# Patient Record
Sex: FEMALE | Race: BLACK OR AFRICAN AMERICAN | NOT HISPANIC OR LATINO | Employment: FULL TIME | URBAN - METROPOLITAN AREA
[De-identification: names, ages, dates, MRNs, and addresses within clinical notes are randomized per-mention and may not be internally consistent; named-entity substitution may affect disease eponyms.]

---

## 2017-04-12 ENCOUNTER — HOSPITAL ENCOUNTER (EMERGENCY)
Facility: HOSPITAL | Age: 52
Discharge: HOME | End: 2017-04-12
Attending: EMERGENCY MEDICINE
Payer: MEDICAID

## 2017-04-12 VITALS
SYSTOLIC BLOOD PRESSURE: 161 MMHG | WEIGHT: 205 LBS | HEIGHT: 67 IN | RESPIRATION RATE: 18 BRPM | HEART RATE: 62 BPM | DIASTOLIC BLOOD PRESSURE: 73 MMHG | BODY MASS INDEX: 32.18 KG/M2 | TEMPERATURE: 98 F | OXYGEN SATURATION: 100 %

## 2017-04-12 DIAGNOSIS — R42 DIZZINESS: ICD-10-CM

## 2017-04-12 LAB
ALBUMIN SERPL BCP-MCNC: 4 G/DL
ALP SERPL-CCNC: 93 U/L
ALT SERPL W/O P-5'-P-CCNC: 17 U/L
ANION GAP SERPL CALC-SCNC: 9 MMOL/L
AST SERPL-CCNC: 16 U/L
BASOPHILS # BLD AUTO: 0.02 K/UL
BASOPHILS NFR BLD: 0.4 %
BILIRUB SERPL-MCNC: 0.5 MG/DL
BILIRUB UR QL STRIP: NEGATIVE
BUN SERPL-MCNC: 13 MG/DL
CALCIUM SERPL-MCNC: 9.6 MG/DL
CHLORIDE SERPL-SCNC: 104 MMOL/L
CLARITY UR: CLEAR
CO2 SERPL-SCNC: 26 MMOL/L
COLOR UR: YELLOW
CREAT SERPL-MCNC: 0.7 MG/DL
DIFFERENTIAL METHOD: ABNORMAL
EOSINOPHIL # BLD AUTO: 0.1 K/UL
EOSINOPHIL NFR BLD: 2.6 %
ERYTHROCYTE [DISTWIDTH] IN BLOOD BY AUTOMATED COUNT: 14.5 %
EST. GFR  (AFRICAN AMERICAN): >60 ML/MIN/1.73 M^2
EST. GFR  (NON AFRICAN AMERICAN): >60 ML/MIN/1.73 M^2
GLUCOSE SERPL-MCNC: 107 MG/DL
GLUCOSE UR QL STRIP: NEGATIVE
HCT VFR BLD AUTO: 33.4 %
HGB BLD-MCNC: 11.8 G/DL
HGB UR QL STRIP: NEGATIVE
KETONES UR QL STRIP: NEGATIVE
LEUKOCYTE ESTERASE UR QL STRIP: NEGATIVE
LYMPHOCYTES # BLD AUTO: 1.4 K/UL
LYMPHOCYTES NFR BLD: 30.2 %
MCH RBC QN AUTO: 25.5 PG
MCHC RBC AUTO-ENTMCNC: 35.3 %
MCV RBC AUTO: 72 FL
MONOCYTES # BLD AUTO: 0.8 K/UL
MONOCYTES NFR BLD: 18.3 %
NEUTROPHILS # BLD AUTO: 2.2 K/UL
NEUTROPHILS NFR BLD: 48.5 %
NITRITE UR QL STRIP: NEGATIVE
PH UR STRIP: 8 [PH] (ref 5–8)
PLATELET # BLD AUTO: 177 K/UL
PMV BLD AUTO: 11.8 FL
POTASSIUM SERPL-SCNC: 4.3 MMOL/L
PROT SERPL-MCNC: 7.9 G/DL
PROT UR QL STRIP: NEGATIVE
RBC # BLD AUTO: 4.63 M/UL
SODIUM SERPL-SCNC: 139 MMOL/L
SP GR UR STRIP: 1.01 (ref 1–1.03)
TROPONIN I SERPL DL<=0.01 NG/ML-MCNC: 0.01 NG/ML
URN SPEC COLLECT METH UR: NORMAL
UROBILINOGEN UR STRIP-ACNC: NEGATIVE EU/DL
WBC # BLD AUTO: 4.6 K/UL

## 2017-04-12 PROCEDURE — 93005 ELECTROCARDIOGRAM TRACING: CPT

## 2017-04-12 PROCEDURE — 99284 EMERGENCY DEPT VISIT MOD MDM: CPT

## 2017-04-12 PROCEDURE — 85025 COMPLETE CBC W/AUTO DIFF WBC: CPT

## 2017-04-12 PROCEDURE — 93010 ELECTROCARDIOGRAM REPORT: CPT | Mod: ,,, | Performed by: INTERNAL MEDICINE

## 2017-04-12 PROCEDURE — 81003 URINALYSIS AUTO W/O SCOPE: CPT

## 2017-04-12 PROCEDURE — 84484 ASSAY OF TROPONIN QUANT: CPT

## 2017-04-12 PROCEDURE — 80053 COMPREHEN METABOLIC PANEL: CPT

## 2017-04-12 RX ORDER — CARVEDILOL 6.25 MG/1
6.25 TABLET ORAL 2 TIMES DAILY WITH MEALS
COMMUNITY

## 2017-04-12 RX ORDER — MECLIZINE HYDROCHLORIDE 25 MG/1
25 TABLET ORAL 2 TIMES DAILY PRN
Qty: 20 TABLET | Refills: 0 | Status: SHIPPED | OUTPATIENT
Start: 2017-04-12 | End: 2018-10-22

## 2017-04-12 NOTE — ED AVS SNAPSHOT
[facility]  [address] [place] [address]               Raiza Padilla   2017  7:06 AM   ED    Description:  Female : 1965   Department:  [facility]           Your Care was Coordinated By:     Provider Role From To    Joe Ramirez MD Attending Provider 17 0718 --      Reason for Visit     Dizziness           Diagnoses this Visit        Comments    Dizziness           ED Disposition     None           To Do List           Follow-up Information     Schedule an appointment as soon as possible for a visit with your primary doctor.        Follow up with [facility].    Specialty:  Emergency Medicine    Why:  If symptoms worsen    Contact information:    [address]  [place] [address] [phone]       These Medications        Disp Refills Start End    meclizine (ANTIVERT) 25 mg tablet 20 tablet 0 2017     Take 1 tablet (25 mg total) by mouth 2 (two) times daily as needed for Dizziness. - Oral      [facility]     [facility] Nurse Care Line -  Assistance  Unless otherwise directed by your provider, please contact [facility], our nurse care line that is available for  assistance.     Registered nurses in the [facility] provide: appointment scheduling, clinical advisement, health education, and other advisory services.  Call: [phone] (toll free)               Medications           Message regarding Medications     Verify the changes and/or additions to your medication regime listed below are the same as discussed with your clinician today.  If any of these changes or additions are incorrect, please notify your healthcare provider.        START taking these NEW medications        Refills    meclizine (ANTIVERT) 25 mg tablet 0    Sig: Take 1 tablet (25 mg total) by mouth 2 (two) times daily as needed for Dizziness.    Class: Print     "Route: Oral           Verify that the below list of medications is an accurate representation of the medications you are currently taking.  If none reported, the list may be blank. If incorrect, please contact your healthcare provider. Carry this list with you in case of emergency.           Current Medications     carvedilol (COREG) 6.25 MG tablet Take 6.25 mg by mouth 2 (two) times daily with meals.    meclizine (ANTIVERT) 25 mg tablet Take 1 tablet (25 mg total) by mouth 2 (two) times daily as needed for Dizziness.           Clinical Reference Information           Your Vitals Were     BP Pulse Temp Resp Height Weight    161/73 (BP Location: Right arm, Patient Position: Standing, BP Method: Automatic) 62 98.1 °F (36.7 °C) (Oral) 18 5' 7" (1.702 m) 93 kg (205 lb)    Last Period SpO2 BMI          08/12/2016 100% 32.11 kg/m2        Allergies as of 4/12/2017        Reactions    Codeine Nausea And Vomiting      Immunizations Administered on Date of Encounter - 4/12/2017     None      ED Micro, Lab, POCT     Start Ordered       Status Ordering Provider    04/12/17 0723 04/12/17 0722  CBC auto differential  STAT      Final result     04/12/17 0723 04/12/17 0722  Comprehensive metabolic panel  STAT      Final result     04/12/17 0723 04/12/17 0722  Troponin I  STAT      Final result     04/12/17 0723 04/12/17 0722  Urinalysis  STAT      Final result       ED Imaging Orders     Start Ordered       Status Ordering Provider    04/12/17 0723 04/12/17 0723  X-Ray Chest 1 View  1 time imaging      Final result         Discharge Instructions         Dizziness (Uncertain Cause)  Dizziness is a common symptom. It may be described as lightheadedness, spinning, or feeling like you are going to faint. Dizziness can have many causes.  Be sure to tell the healthcare provider about:  · All medicines you take, including prescription, over-the-counter, herbs, and supplements  · Any other symptoms you have  · Any health problems you are " being treated for  · Anything that causes the dizziness to get worse or better  Today's exam did not show an exact cause for your dizziness. Other tests may be needed. Follow up with your healthcare provider.  Home care  · Dizziness that occurs with sudden standing may be a sign of mild dehydration. Drink extra fluids for the next few days.  · If you recently started a new medicine, stopped a medicine, or had the dose of a current medicine changed, talk with the prescribing healthcare provider. Your medicine plan may need adjustment.  · If dizziness lasts more than a few seconds, sit or lie down until it passes. This may help prevent injury in case you pass out.  · Do not drive or use power tools or dangerous equipment until you have had no dizziness for at least 48 hours.  Follow-up care  Follow up with your healthcare provider for further evaluation within the next 7 days or as advised.  When to seek medical advice  Call your healthcare provider for any of the following:  · Worsening of symptoms or new symptoms  · Passing out or seizure  · Repeated vomiting  · Headache  · Palpitations (the sense that your heart is fluttering or beating fast or hard)  · Shortness of breath  · Blood in vomit or stool (black or red color)  · Weakness of an arm or leg or one side of the face  · Vision or hearing changes  · Trouble walking or speaking  · Chest, arm, neck, back, or jaw pain  Date Last Reviewed: 8/23/2015  © 9672-2781 [employer]. [address] [place] [address]. All rights reserved. This information is not intended as a substitute for professional medical care. Always follow your healthcare professional's instructions.          [facility] Sign-Up     Activating your [facility] account is as easy as 1-2-3!     1) Visit [email or web address], select Sign Up Now, enter this activation code and your date of birth, then select Next.  [email or web address]  Expires: 5/27/2017 10:55 AM      2) Create a username and  password to use when you visit [facility] in the future and select a security question in case you lose your password and select Next.    3) Enter your e-mail address and click Sign Up!    Additional Information  If you have questions, please e-mail [email or web address] or call [phone] to talk to our [facility] staff. Remember, [facility] is NOT to be used for urgent needs. For medical emergencies, dial 911.          [facility] complies with applicable Federal civil rights laws and does not discriminate on the basis of race, color, national origin, age, disability, or sex.        Language Assistance Services     ATTENTION: Language assistance services are available, free of charge. Please call [phone].      ATENCIÓN: Si habla español, tiene a gonzales disposición servicios gratuitos de asistencia lingüística. Llame al [phone].     CHÚ Ý: N?u b?n nói Ti?ng Vi?t, có các d?ch v? h? tr? ngôn ng? mi?n phí dành cho b?n. G?i s? [phone].

## 2017-04-12 NOTE — ED PROVIDER NOTES
Encounter Date: 2017       History     Chief Complaint   Patient presents with    Dizziness     pt states she woke up this morning with dizziness and nausea     Review of patient's allergies indicates:   Allergen Reactions    Codeine Nausea And Vomiting     HPI Comments: Patient is a 52-year-old female who says she felt lightheaded upon awakening this morning.  Patient says this usually happens to her every morning but only lasts very briefly and resolves.  Symptoms persisted this morning.  She had no syncope.  She denies headache or visual changes.  No neck pain or stiffness.  She denies chest pain or shortness of breath.    The history is provided by the patient.     Past Medical History:   Diagnosis Date    Hypertension      Past Surgical History:   Procedure Laterality Date     SECTION      TUBAL LIGATION       History reviewed. No pertinent family history.  Social History   Substance Use Topics    Smoking status: Never Smoker    Smokeless tobacco: Never Used    Alcohol use Yes      Comment: socially     Review of Systems   Constitutional: Negative for chills and fever.   Respiratory: Negative for shortness of breath.    Cardiovascular: Negative for chest pain.   Gastrointestinal: Negative for abdominal pain, nausea and vomiting.   Neurological: Positive for dizziness and light-headedness. Negative for syncope, weakness and numbness.   All other systems reviewed and are negative.      Physical Exam   Initial Vitals   BP Pulse Resp Temp SpO2   17 0700 17 0700 17 0700 17 0700 17 0700   169/73 60 18 98.1 °F (36.7 °C) 100 %     Physical Exam    Nursing note and vitals reviewed.  Constitutional: She appears well-developed and well-nourished.   HENT:   Head: Normocephalic and atraumatic.   Eyes: Conjunctivae and EOM are normal. Pupils are equal, round, and reactive to light.   Neck: Normal range of motion. Neck supple.   Cardiovascular: Normal rate, regular rhythm and  normal heart sounds.   Pulmonary/Chest: Breath sounds normal.   Abdominal: Soft. There is no tenderness. There is no rebound and no guarding.   Musculoskeletal: Normal range of motion.   Neurological: She is alert and oriented to person, place, and time. She has normal strength.   Skin: Skin is warm and dry.   Psychiatric: She has a normal mood and affect.         ED Course   Procedures  Labs Reviewed   CBC W/ AUTO DIFFERENTIAL - Abnormal; Notable for the following:        Result Value    Hemoglobin 11.8 (*)     Hematocrit 33.4 (*)     MCV 72 (*)     MCH 25.5 (*)     Mono% 18.3 (*)     All other components within normal limits   COMPREHENSIVE METABOLIC PANEL   TROPONIN I   URINALYSIS     EKG Readings: (Independently Interpreted)   Rhythm: Sinus Bradycardia. Heart Rate: 50. ST Segments: Normal ST Segments. T Waves: Normal.          Medical Decision Making:   Clinical Tests:   Lab Tests: Ordered and Reviewed  The following lab test(s) were unremarkable: CBC, CMP and Troponin  Radiological Study: Ordered and Reviewed  Medical Tests: Reviewed and Ordered  ED Management:  52-year-old female who felt dizzy this morning.  Patient feels much better after presenting to the ED.  She was noted with sinus bradycardia on her EKG with a rate of 50.  Patient is currently on a beta blocker.  Orthostatics are negative.  Heart rate has maintained in the low 60s.  I do not feel that her symptoms were due to bradycardia.  I have offered to give her an Antivert here in the ED although she refuses.  Patient says she feels better, has not eaten and would like to go home and rest.  I will send home with a prescription for Antivert to use as needed but have also urged very close follow-up for recheck of vital signs specifically her heart rate.  She will also return here for any worsening of her condition occurring prior to follow-up.                   ED Course     Clinical Impression:   The encounter diagnosis was Dizziness.           Joe Ramirez MD  04/12/17 0062

## 2017-04-12 NOTE — DISCHARGE INSTRUCTIONS

## 2017-04-12 NOTE — ED NOTES
Assumed care of this patient. Pt is resting on stretcehr, on cardiac, bp and o2 sat monitor. SR up and CB in reach. No distress noted, no complaints noted.

## 2017-04-15 ENCOUNTER — HOSPITAL ENCOUNTER (EMERGENCY)
Facility: HOSPITAL | Age: 52
Discharge: HOME | End: 2017-04-15
Attending: EMERGENCY MEDICINE
Payer: MEDICAID

## 2017-04-15 VITALS
RESPIRATION RATE: 19 BRPM | WEIGHT: 190 LBS | OXYGEN SATURATION: 99 % | HEART RATE: 55 BPM | SYSTOLIC BLOOD PRESSURE: 165 MMHG | HEIGHT: 67 IN | TEMPERATURE: 99 F | BODY MASS INDEX: 29.82 KG/M2 | DIASTOLIC BLOOD PRESSURE: 70 MMHG

## 2017-04-15 DIAGNOSIS — I10 ESSENTIAL HYPERTENSION: ICD-10-CM

## 2017-04-15 DIAGNOSIS — K52.9 GASTROENTERITIS: Primary | ICD-10-CM

## 2017-04-15 LAB
ALBUMIN SERPL BCP-MCNC: 4.4 G/DL
ALP SERPL-CCNC: 77 U/L
ALT SERPL W/O P-5'-P-CCNC: 43 U/L
ANION GAP SERPL CALC-SCNC: 11 MMOL/L
AST SERPL-CCNC: 31 U/L
BACTERIA #/AREA URNS HPF: NORMAL /HPF
BASOPHILS # BLD AUTO: 0.01 K/UL
BASOPHILS NFR BLD: 0.2 %
BILIRUB SERPL-MCNC: 0.6 MG/DL
BILIRUB UR QL STRIP: NEGATIVE
BUN SERPL-MCNC: 15 MG/DL
CALCIUM SERPL-MCNC: 9.8 MG/DL
CHLORIDE SERPL-SCNC: 105 MMOL/L
CLARITY UR: CLEAR
CO2 SERPL-SCNC: 23 MMOL/L
COLOR UR: YELLOW
CREAT SERPL-MCNC: 0.8 MG/DL
DIFFERENTIAL METHOD: ABNORMAL
EOSINOPHIL # BLD AUTO: 0 K/UL
EOSINOPHIL NFR BLD: 0.6 %
ERYTHROCYTE [DISTWIDTH] IN BLOOD BY AUTOMATED COUNT: 14.5 %
EST. GFR  (AFRICAN AMERICAN): >60 ML/MIN/1.73 M^2
EST. GFR  (NON AFRICAN AMERICAN): >60 ML/MIN/1.73 M^2
GLUCOSE SERPL-MCNC: 96 MG/DL
GLUCOSE UR QL STRIP: NEGATIVE
HCT VFR BLD AUTO: 37.1 %
HGB BLD-MCNC: 13.2 G/DL
HGB UR QL STRIP: NEGATIVE
HYALINE CASTS #/AREA URNS LPF: 0 /LPF
KETONES UR QL STRIP: ABNORMAL
LEUKOCYTE ESTERASE UR QL STRIP: NEGATIVE
LIPASE SERPL-CCNC: 20 U/L
LYMPHOCYTES # BLD AUTO: 1.5 K/UL
LYMPHOCYTES NFR BLD: 23.6 %
MCH RBC QN AUTO: 25.4 PG
MCHC RBC AUTO-ENTMCNC: 35.6 %
MCV RBC AUTO: 71 FL
MICROSCOPIC COMMENT: NORMAL
MONOCYTES # BLD AUTO: 0.8 K/UL
MONOCYTES NFR BLD: 13 %
NEUTROPHILS # BLD AUTO: 4 K/UL
NEUTROPHILS NFR BLD: 62.6 %
NITRITE UR QL STRIP: NEGATIVE
PH UR STRIP: 7 [PH] (ref 5–8)
PLATELET # BLD AUTO: 204 K/UL
PLATELET BLD QL SMEAR: ABNORMAL
PMV BLD AUTO: 11.3 FL
POTASSIUM SERPL-SCNC: 3.9 MMOL/L
PROT SERPL-MCNC: 8.5 G/DL
PROT UR QL STRIP: ABNORMAL
RBC # BLD AUTO: 5.2 M/UL
RBC #/AREA URNS HPF: 1 /HPF (ref 0–4)
SODIUM SERPL-SCNC: 139 MMOL/L
SP GR UR STRIP: 1.02 (ref 1–1.03)
URN SPEC COLLECT METH UR: ABNORMAL
UROBILINOGEN UR STRIP-ACNC: 1 EU/DL
WBC # BLD AUTO: 6.39 K/UL
WBC #/AREA URNS HPF: 2 /HPF (ref 0–5)

## 2017-04-15 PROCEDURE — 93010 ELECTROCARDIOGRAM REPORT: CPT | Mod: ,,, | Performed by: INTERNAL MEDICINE

## 2017-04-15 PROCEDURE — 25000003 PHARM REV CODE 250: Performed by: EMERGENCY MEDICINE

## 2017-04-15 PROCEDURE — 63600175 PHARM REV CODE 636 W HCPCS: Performed by: EMERGENCY MEDICINE

## 2017-04-15 PROCEDURE — 81000 URINALYSIS NONAUTO W/SCOPE: CPT

## 2017-04-15 PROCEDURE — 96375 TX/PRO/DX INJ NEW DRUG ADDON: CPT

## 2017-04-15 PROCEDURE — 93005 ELECTROCARDIOGRAM TRACING: CPT

## 2017-04-15 PROCEDURE — 96374 THER/PROPH/DIAG INJ IV PUSH: CPT

## 2017-04-15 PROCEDURE — 99284 EMERGENCY DEPT VISIT MOD MDM: CPT | Mod: 25

## 2017-04-15 PROCEDURE — 83690 ASSAY OF LIPASE: CPT

## 2017-04-15 PROCEDURE — 96361 HYDRATE IV INFUSION ADD-ON: CPT

## 2017-04-15 PROCEDURE — 80053 COMPREHEN METABOLIC PANEL: CPT

## 2017-04-15 PROCEDURE — 85025 COMPLETE CBC W/AUTO DIFF WBC: CPT

## 2017-04-15 RX ORDER — ONDANSETRON 4 MG/1
4 TABLET, FILM COATED ORAL EVERY 6 HOURS PRN
Qty: 30 TABLET | Refills: 0 | Status: SHIPPED | OUTPATIENT
Start: 2017-04-15 | End: 2018-10-22

## 2017-04-15 RX ORDER — HYDROMORPHONE HYDROCHLORIDE 1 MG/ML
0.5 INJECTION, SOLUTION INTRAMUSCULAR; INTRAVENOUS; SUBCUTANEOUS
Status: COMPLETED | OUTPATIENT
Start: 2017-04-15 | End: 2017-04-15

## 2017-04-15 RX ORDER — ONDANSETRON 2 MG/ML
8 INJECTION INTRAMUSCULAR; INTRAVENOUS
Status: COMPLETED | OUTPATIENT
Start: 2017-04-15 | End: 2017-04-15

## 2017-04-15 RX ADMIN — SODIUM CHLORIDE 1000 ML: 0.9 INJECTION, SOLUTION INTRAVENOUS at 08:04

## 2017-04-15 RX ADMIN — HYDROMORPHONE HYDROCHLORIDE 0.5 MG: 1 INJECTION, SOLUTION INTRAMUSCULAR; INTRAVENOUS; SUBCUTANEOUS at 08:04

## 2017-04-15 RX ADMIN — ONDANSETRON HYDROCHLORIDE 8 MG: 2 SOLUTION INTRAMUSCULAR; INTRAVENOUS at 08:04

## 2017-04-15 NOTE — ED AVS SNAPSHOT
[facility]  [address]  [place] [address]               Raiza Padilla   4/15/2017  7:11 PM   ED    Description:  Female : 1965   Department:  [facility]           Your Care was Coordinated By:     Provider Role From To    Marcia Rodrigues MD Attending Provider 04/15/17 2683 --      Reason for Visit     Emesis           Diagnoses this Visit        Comments    Gastroenteritis    -  Primary     Essential hypertension           ED Disposition     None           To Do List           Follow-up Information     Follow up with [facility] In 3 days.    Specialty:  Family Medicine    Contact information:    [address] [place] [address]  [phone]         These Medications        Disp Refills Start End    ondansetron (ZOFRAN) 4 MG tablet 30 tablet 0 4/15/2017     Take 1 tablet (4 mg total) by mouth every 6 (six) hours as needed for Nausea. - Oral      [facility]     [facility] Nurse Care Line -  Assistance  Unless otherwise directed by your provider, please contact [facility], our nurse care line that is available for  assistance.     Registered nurses in the [facility] provide: appointment scheduling, clinical advisement, health education, and other advisory services.  Call: [phone] (toll free)               Medications           Message regarding Medications     Verify the changes and/or additions to your medication regime listed below are the same as discussed with your clinician today.  If any of these changes or additions are incorrect, please notify your healthcare provider.        START taking these NEW medications        Refills    ondansetron (ZOFRAN) 4 MG tablet 0    Sig: Take 1 tablet (4 mg total) by mouth every 6 (six) hours as needed for Nausea.    Class: Print    Route: Oral      These medications were administered today        Dose Freq    sodium  "chloride 0.9% bolus 1,000 mL 1,000 mL Once    Sig: Inject 1,000 mLs into the vein once.    Class: Normal    Route: Intravenous    ondansetron injection 8 mg 8 mg ED 1 Time    Sig: Inject 8 mg into the vein ED 1 Time.    Class: Normal    Route: Intravenous    HYDROmorphone injection 0.5 mg 0.5 mg ED 1 Time    Sig: Inject 0.5 mLs (0.5 mg total) into the vein ED 1 Time.    Class: Normal    Route: Intravenous           Verify that the below list of medications is an accurate representation of the medications you are currently taking.  If none reported, the list may be blank. If incorrect, please contact your healthcare provider. Carry this list with you in case of emergency.           Current Medications     carvedilol (COREG) 6.25 MG tablet Take 6.25 mg by mouth 2 (two) times daily with meals.    meclizine (ANTIVERT) 25 mg tablet Take 1 tablet (25 mg total) by mouth 2 (two) times daily as needed for Dizziness.    ondansetron (ZOFRAN) 4 MG tablet Take 1 tablet (4 mg total) by mouth every 6 (six) hours as needed for Nausea.           Clinical Reference Information           Your Vitals Were     BP Pulse Temp Resp Height Weight    161/73 (BP Location: Left arm, Patient Position: Lying, BP Method: Automatic) 58 98.7 °F (37.1 °C) (Oral) 19 5' 7" (1.702 m) 86.2 kg (190 lb)    Last Period SpO2 BMI          08/12/2016 99% 29.76 kg/m2        Allergies as of 4/15/2017        Reactions    Codeine Nausea And Vomiting      Immunizations Administered on Date of Encounter - 4/15/2017     None      ED Micro, Lab, POCT     Start Ordered       Status Ordering Provider    04/15/17 1928 04/15/17 1928  Urinalysis Microscopic  Once      Final result     04/15/17 1926 04/15/17 1928  CBC W/ AUTO DIFFERENTIAL  Once      Final result     04/15/17 1926 04/15/17 1928  Comp. Metabolic Panel  STAT      Final result     04/15/17 1926 04/15/17 1928  Lipase  STAT      Final result     04/15/17 1926 04/15/17 1928  Urinalysis - Clean Catch  STAT      " Final result       ED Imaging Orders     Start Ordered       Status Ordering Provider    04/15/17 1926 04/15/17 1928  X-Ray Abdomen Flat And Erect  1 time imaging      Final result         Discharge Instructions         Controlling High Blood Pressure  High blood pressure (hypertension) is often called the silent killer. This is because many people who have it dont know it. High blood pressure is defined as 140/90 mm Hg or higher. Know your blood pressure and remember to check it regularly. Doing so can save your life. Here are some things you can do to help control your blood pressure.    Choose heart-healthy foods  · Select low-salt, low-fat foods. Limit sodium intake to 2,400 mg per day or the amount suggested by your healthcare provider.  · Limit canned, dried, cured, packaged, and fast foods. These can contain a lot of salt.  · Eat 8 to 10 servings of fruits and vegetables every day.  · Choose lean meats, fish, or chicken.  · Eat whole-grain pasta, brown rice, and beans.  · Eat 2 to 3 servings of low-fat or fat-free dairy products.  · Ask your doctor about the DASH eating plan. This plan helps reduce blood pressure.  · When you go to a restaurant, ask that your meal be prepared with no added salt.  Maintain a healthy weight  · Ask your healthcare provider how many calories to eat a day. Then stick to that number.  · Ask your healthcare provider what weight range is healthiest for you. If you are overweight, a weight loss of only 3% to 5% of your body weight can help lower blood pressure. Generally, a good weight loss goal is to lose 10% of your body weight in a year.  · Limit snacks and sweets.  · Get regular exercise.  Get up and get active  · Choose activities you enjoy. Find ones you can do with friends or family. This includes bicycling, dancing, walking, and jogging.  · Park farther away from building entrances.  · Use stairs instead of the elevator.  · When you can, walk or bike instead of  driving.  · [place] leaves, garden, or do household repairs.  · Be active at a moderate to vigorous level of physical activity for at least 40 minutes for a minimum of 3 to 4 days a week.   Manage stress  · Make time to relax and enjoy life. Find time to laugh.  · Communicate your concerns with your loved ones and your healthcare provider.  · Visit with family and friends, and keep up with hobbies.  Limit alcohol and quit smoking  · Men should have no more than 2 drinks per day.  · Women should have no more than 1 drink per day.  · Talk with your healthcare provider about quitting smoking. Smoking significantly increases your risk for heart disease and stroke. Ask your healthcare provider about community smoking cessation programs and other options.  Medicines  If lifestyle changes arent enough, your healthcare provider may prescribe high blood pressure medicine. Take all medicines as prescribed. If you have any questions about your medicines, ask your healthcare provider before stopping or changing them.   Date Last Reviewed: 4/27/2016  © 8572-3780 [employer]. [address] [place] [address], [place] [address]. All rights reserved. This information is not intended as a substitute for professional medical care. Always follow your healthcare professional's instructions.          Discharge References/Attachments     GASTROENTERITIS, VIRAL (ADULT) (ENGLISH)      [facility] Sign-Up     Activating your [facility] account is as easy as 1-2-3!     1) Visit [email or web address], select Sign Up Now, enter this activation code and your date of birth, then select Next.  [email or web address]  Expires: 5/27/2017 10:55 AM      2) Create a username and password to use when you visit [facility] in the future and select a security question in case you lose your password and select Next.    3) Enter your e-mail address and click Sign Up!    Additional Information  If you have questions, please e-mail [email or web address] or call  [phone] to talk to our [facility] staff. Remember, [facility] is NOT to be used for urgent needs. For medical emergencies, dial 911.          [facility] complies with applicable Federal civil rights laws and does not discriminate on the basis of race, color, national origin, age, disability, or sex.        Language Assistance Services     ATTENTION: Language assistance services are available, free of charge. Please call [phone].      ATENCIÓN: Si habla chiquita, tiene a gonzales disposición servicios gratuitos de asistencia lingüística. Llame al [phone].     CHÚ Ý: N?u b?n nói Ti?ng Vi?t, có các d?ch v? h? tr? ngôn ng? mi?n phí dành cho b?n. G?i s? [phone].

## 2017-04-16 NOTE — ED PROVIDER NOTES
Encounter Date: 4/15/2017       History     Chief Complaint   Patient presents with    Emesis     c/o N/V/D since Thursday. States seen here Thursday with no relief.     Review of patient's allergies indicates:   Allergen Reactions    Codeine Nausea And Vomiting     HPI Comments: This is a 51 y/o F who presents to the ED for nausea, vomiting and diarrhea x the past 3 days.  She was seen in the ED Wednesday for lightheadedness and fatigue, had labwork and xray which were unrevealing.  Reports started having nausea and vomiting on Thursday which has persisted.  Reports a burning sensation in upper abdomen which has been mostly over the past 2 days since not being able to keep anything down.  Denies chest pain or shortness of breath.  Feels weak following vomiting.  No urinary symptoms.  Hx of HTN and has been unable to keep her medicine down but does not complaint of headache, blurry vision, focal numbness or weakness.     Patient is a 52 y.o. female presenting with the following complaint: vomiting. The history is provided by the patient.   Emesis    This is a new problem. The current episode started several days ago. The problem occurs 2 - 4 times per day. The problem has been unchanged. The emesis has an appearance of stomach contents and bilious material. Associated symptoms include abdominal pain and diarrhea. Pertinent negatives include no chills, no cough, no fever, no headaches, no sweats and no URI.     Past Medical History:   Diagnosis Date    Hypertension      Past Surgical History:   Procedure Laterality Date     SECTION      TUBAL LIGATION       No family history on file.  Social History   Substance Use Topics    Smoking status: Never Smoker    Smokeless tobacco: Never Used    Alcohol use Yes      Comment: socially     Review of Systems   Constitutional: Negative for chills and fever.   HENT: Negative for sore throat.    Respiratory: Negative for cough and shortness of breath.     Cardiovascular: Negative for chest pain.   Gastrointestinal: Positive for abdominal pain, diarrhea, nausea and vomiting.   Genitourinary: Negative for dysuria.   Musculoskeletal: Negative for back pain.   Skin: Negative for rash.   Neurological: Negative for weakness and headaches.   Hematological: Does not bruise/bleed easily.   All other systems reviewed and are negative.      Physical Exam   Initial Vitals   BP Pulse Resp Temp SpO2   04/15/17 1910 04/15/17 1910 04/15/17 1910 04/15/17 1910 04/15/17 1910   194/77 57 16 98.7 °F (37.1 °C) 99 %     Physical Exam    Nursing note and vitals reviewed.  Constitutional: She appears well-developed and well-nourished.   HENT:   Head: Normocephalic and atraumatic.   Dry mucosa noted.    Eyes: Conjunctivae and EOM are normal. Pupils are equal, round, and reactive to light. Right eye exhibits no discharge. Left eye exhibits no discharge. No scleral icterus.   Neck: Normal range of motion. Neck supple.   Cardiovascular: Normal rate, regular rhythm and normal heart sounds. Exam reveals no gallop and no friction rub.    No murmur heard.  Pulmonary/Chest: Breath sounds normal. No stridor. No respiratory distress. She has no wheezes. She has no rhonchi. She has no rales.   Abdominal: Soft. Bowel sounds are normal. She exhibits no distension and no mass. There is no tenderness. There is no rebound and no guarding.   Neurological: She is alert and oriented to person, place, and time. She has normal strength. No cranial nerve deficit or sensory deficit.   Skin: Skin is warm and dry.   Psychiatric: She has a normal mood and affect. Her behavior is normal. Judgment and thought content normal.         ED Course   Procedures  Labs Reviewed   CBC W/ AUTO DIFFERENTIAL   COMPREHENSIVE METABOLIC PANEL   LIPASE   URINALYSIS             Medical Decision Making:   Initial Assessment:   53 y/o F with history of nausea, vomiting and diarrhea starting Thursday following ED visit for  lightheadedness on Wednesday.  Plan IV fluids, labs, xray and monitoring, reassess.     patient reassessed and felt much better, tolerating liquids.  Will rx antiemetics. Understands she should return for severe abd pain, perisistent vomiting or worsening symptoms.                  ED Course     Clinical Impression:   The primary encounter diagnosis was Gastroenteritis. A diagnosis of Essential hypertension was also pertinent to this visit.          Marcia Rodrigues MD  04/16/17 9183

## 2017-04-16 NOTE — ED NOTES
Explained IVF and medication indication, SE, AE, reactions and expected outcomes. Pt verbalizes understanding. Agrees to treatment plan. Denies allergy.

## 2017-04-16 NOTE — DISCHARGE INSTRUCTIONS
Controlling High Blood Pressure  High blood pressure (hypertension) is often called the silent killer. This is because many people who have it dont know it. High blood pressure is defined as 140/90 mm Hg or higher. Know your blood pressure and remember to check it regularly. Doing so can save your life. Here are some things you can do to help control your blood pressure.    Choose heart-healthy foods  · Select low-salt, low-fat foods. Limit sodium intake to 2,400 mg per day or the amount suggested by your healthcare provider.  · Limit canned, dried, cured, packaged, and fast foods. These can contain a lot of salt.  · Eat 8 to 10 servings of fruits and vegetables every day.  · Choose lean meats, fish, or chicken.  · Eat whole-grain pasta, brown rice, and beans.  · Eat 2 to 3 servings of low-fat or fat-free dairy products.  · Ask your doctor about the DASH eating plan. This plan helps reduce blood pressure.  · When you go to a restaurant, ask that your meal be prepared with no added salt.  Maintain a healthy weight  · Ask your healthcare provider how many calories to eat a day. Then stick to that number.  · Ask your healthcare provider what weight range is healthiest for you. If you are overweight, a weight loss of only 3% to 5% of your body weight can help lower blood pressure. Generally, a good weight loss goal is to lose 10% of your body weight in a year.  · Limit snacks and sweets.  · Get regular exercise.  Get up and get active  · Choose activities you enjoy. Find ones you can do with friends or family. This includes bicycling, dancing, walking, and jogging.  · Park farther away from building entrances.  · Use stairs instead of the elevator.  · When you can, walk or bike instead of driving.  · [place] leaves, garden, or do household repairs.  · Be active at a moderate to vigorous level of physical activity for at least 40 minutes for a minimum of 3 to 4 days a week.   Manage stress  · Make time to relax and enjoy  life. Find time to laugh.  · Communicate your concerns with your loved ones and your healthcare provider.  · Visit with family and friends, and keep up with hobbies.  Limit alcohol and quit smoking  · Men should have no more than 2 drinks per day.  · Women should have no more than 1 drink per day.  · Talk with your healthcare provider about quitting smoking. Smoking significantly increases your risk for heart disease and stroke. Ask your healthcare provider about community smoking cessation programs and other options.  Medicines  If lifestyle changes arent enough, your healthcare provider may prescribe high blood pressure medicine. Take all medicines as prescribed. If you have any questions about your medicines, ask your healthcare provider before stopping or changing them.   Date Last Reviewed: 4/27/2016  © 5577-4084 [employer] [address] [place] [address], [place] [address]. All rights reserved. This information is not intended as a substitute for professional medical care. Always follow your healthcare professional's instructions.

## 2017-04-16 NOTE — ED NOTES
"Pt. reports diffuse abd pain with n/v since Thursday. Last emesis episode this morning that consisted of "yellow bile". Last meal this morning-oatmeal. Denies fever, chills. Denies CP. Denies SOB. Denies dizziness or light headedness. Denies weakness. Denies urinary symptoms. Reports most discomfort due to nausea sensation. Denies sick family members in household. NAD. Placed on hospital gown and connected to portable cardiac monitor. Spouse at bedside.   "

## 2018-08-06 ENCOUNTER — HOSPITAL ENCOUNTER (EMERGENCY)
Facility: HOSPITAL | Age: 53
Discharge: HOME | End: 2018-08-06
Attending: EMERGENCY MEDICINE
Payer: MEDICAID

## 2018-08-06 VITALS
HEART RATE: 58 BPM | DIASTOLIC BLOOD PRESSURE: 60 MMHG | SYSTOLIC BLOOD PRESSURE: 124 MMHG | BODY MASS INDEX: 29.82 KG/M2 | WEIGHT: 190 LBS | HEIGHT: 67 IN | OXYGEN SATURATION: 100 % | TEMPERATURE: 99 F | RESPIRATION RATE: 20 BRPM

## 2018-08-06 DIAGNOSIS — M89.8X1 PAIN IN SCAPULA: Primary | ICD-10-CM

## 2018-08-06 DIAGNOSIS — R07.9 CHEST PAIN: ICD-10-CM

## 2018-08-06 LAB
ALBUMIN SERPL BCP-MCNC: 4.3 G/DL
ALP SERPL-CCNC: 109 U/L
ALT SERPL W/O P-5'-P-CCNC: 30 U/L
ANION GAP SERPL CALC-SCNC: 11 MMOL/L
AST SERPL-CCNC: 18 U/L
BASOPHILS # BLD AUTO: 0.02 K/UL
BASOPHILS NFR BLD: 0.4 %
BILIRUB SERPL-MCNC: 0.5 MG/DL
BUN SERPL-MCNC: 12 MG/DL
CALCIUM SERPL-MCNC: 10 MG/DL
CHLORIDE SERPL-SCNC: 106 MMOL/L
CO2 SERPL-SCNC: 24 MMOL/L
CREAT SERPL-MCNC: 0.7 MG/DL
DIFFERENTIAL METHOD: ABNORMAL
EOSINOPHIL # BLD AUTO: 0.2 K/UL
EOSINOPHIL NFR BLD: 2.9 %
ERYTHROCYTE [DISTWIDTH] IN BLOOD BY AUTOMATED COUNT: 14.8 %
EST. GFR  (AFRICAN AMERICAN): >60 ML/MIN/1.73 M^2
EST. GFR  (NON AFRICAN AMERICAN): >60 ML/MIN/1.73 M^2
GLUCOSE SERPL-MCNC: 106 MG/DL
HCT VFR BLD AUTO: 36 %
HGB BLD-MCNC: 12.3 G/DL
LYMPHOCYTES # BLD AUTO: 2.1 K/UL
LYMPHOCYTES NFR BLD: 40.9 %
MCH RBC QN AUTO: 24.5 PG
MCHC RBC AUTO-ENTMCNC: 34.2 G/DL
MCV RBC AUTO: 72 FL
MONOCYTES # BLD AUTO: 0.9 K/UL
MONOCYTES NFR BLD: 16.6 %
NEUTROPHILS # BLD AUTO: 2 K/UL
NEUTROPHILS NFR BLD: 39.2 %
PLATELET # BLD AUTO: 251 K/UL
PLATELET BLD QL SMEAR: ABNORMAL
PMV BLD AUTO: 11.8 FL
POTASSIUM SERPL-SCNC: 4 MMOL/L
PROT SERPL-MCNC: 8.2 G/DL
RBC # BLD AUTO: 5.03 M/UL
SODIUM SERPL-SCNC: 141 MMOL/L
TROPONIN I SERPL DL<=0.01 NG/ML-MCNC: 0.01 NG/ML
WBC # BLD AUTO: 5.11 K/UL

## 2018-08-06 PROCEDURE — 63600175 PHARM REV CODE 636 W HCPCS: Performed by: EMERGENCY MEDICINE

## 2018-08-06 PROCEDURE — 99284 EMERGENCY DEPT VISIT MOD MDM: CPT | Mod: 25

## 2018-08-06 PROCEDURE — 80053 COMPREHEN METABOLIC PANEL: CPT

## 2018-08-06 PROCEDURE — 84484 ASSAY OF TROPONIN QUANT: CPT

## 2018-08-06 PROCEDURE — 93005 ELECTROCARDIOGRAM TRACING: CPT

## 2018-08-06 PROCEDURE — 25000003 PHARM REV CODE 250: Performed by: EMERGENCY MEDICINE

## 2018-08-06 PROCEDURE — 85025 COMPLETE CBC W/AUTO DIFF WBC: CPT

## 2018-08-06 PROCEDURE — 96374 THER/PROPH/DIAG INJ IV PUSH: CPT

## 2018-08-06 PROCEDURE — 93010 ELECTROCARDIOGRAM REPORT: CPT | Mod: ,,, | Performed by: INTERNAL MEDICINE

## 2018-08-06 RX ORDER — CYCLOBENZAPRINE HCL 10 MG
10 TABLET ORAL 3 TIMES DAILY PRN
Qty: 15 TABLET | Refills: 0 | Status: SHIPPED | OUTPATIENT
Start: 2018-08-06 | End: 2018-08-11

## 2018-08-06 RX ORDER — CYCLOBENZAPRINE HCL 10 MG
10 TABLET ORAL
Status: COMPLETED | OUTPATIENT
Start: 2018-08-06 | End: 2018-08-06

## 2018-08-06 RX ORDER — KETOROLAC TROMETHAMINE 10 MG/1
10 TABLET, FILM COATED ORAL
Qty: 15 TABLET | Refills: 0 | Status: SHIPPED | OUTPATIENT
Start: 2018-08-06 | End: 2018-10-22

## 2018-08-06 RX ORDER — KETOROLAC TROMETHAMINE 30 MG/ML
30 INJECTION, SOLUTION INTRAMUSCULAR; INTRAVENOUS
Status: COMPLETED | OUTPATIENT
Start: 2018-08-06 | End: 2018-08-06

## 2018-08-06 RX ADMIN — CYCLOBENZAPRINE HYDROCHLORIDE 10 MG: 10 TABLET, FILM COATED ORAL at 09:08

## 2018-08-06 RX ADMIN — KETOROLAC TROMETHAMINE 30 MG: 30 INJECTION, SOLUTION INTRAMUSCULAR at 09:08

## 2018-08-06 NOTE — ED PROVIDER NOTES
Encounter Date: 2018    SCRIBE #1 NOTE: I, Justice Cooney, am scribing for, and in the presence of,  Dr. Bauman. I have scribed the entire note.       History     Chief Complaint   Patient presents with    Back Pain     Sudden sharp pain over R scapular area, states hurts to move or touch, pain through to chest, hurts to breathe     Raiza Padilla is a 53 y.o. female who  has a past medical history of Hypertension.    The patient presents to the ED due to back pain that began .5 hours ago. She reports the pain is exacerbated by breathing and moving. She denies that she has ever had the symptoms before. She denies any recent fever, dysuria, nausea, or vomiting          The history is provided by the patient.     Review of patient's allergies indicates:   Allergen Reactions    Codeine Nausea And Vomiting     Past Medical History:   Diagnosis Date    Hypertension      Past Surgical History:   Procedure Laterality Date     SECTION      TUBAL LIGATION       No family history on file.  Social History   Substance Use Topics    Smoking status: Never Smoker    Smokeless tobacco: Never Used    Alcohol use Yes      Comment: socially     Review of Systems   Constitutional: Negative for chills and fever.   HENT: Negative for congestion, rhinorrhea and sore throat.    Eyes: Negative for redness and visual disturbance.   Respiratory: Negative for cough, shortness of breath and wheezing.    Cardiovascular: Negative for chest pain and palpitations.   Gastrointestinal: Negative for abdominal pain, diarrhea, nausea and vomiting.   Genitourinary: Negative for dysuria and hematuria.   Musculoskeletal: Positive for back pain (right scapula). Negative for myalgias and neck pain.   Skin: Negative for rash.   Neurological: Negative for dizziness, weakness and light-headedness.   Psychiatric/Behavioral: Negative for confusion.       Physical Exam     Initial Vitals [18 0929]   BP Pulse Resp Temp SpO2   (!)  141/65 98 20 98.6 °F (37 °C) 100 %      MAP       --         Physical Exam    Nursing note and vitals reviewed.  Constitutional: She appears well-developed and well-nourished. She is not diaphoretic. No distress.   HENT:   Head: Normocephalic and atraumatic.   Eyes: Conjunctivae and EOM are normal. No scleral icterus.   Neck: Normal range of motion. Neck supple.   Cardiovascular: Normal rate, regular rhythm and normal heart sounds. Exam reveals no gallop and no friction rub.    No murmur heard.  Pulmonary/Chest: Breath sounds normal. No respiratory distress. She has no rhonchi. She has no rales.   Abdominal: Soft. Bowel sounds are normal. She exhibits no distension. There is no tenderness. There is no rebound and no guarding.   Musculoskeletal: Normal range of motion. She exhibits tenderness.   Tenderness to palpation to the right scapula. The pain that the patient is referring to is reproducible   Lymphadenopathy:     She has no cervical adenopathy.   Neurological: She is alert and oriented to person, place, and time.   Skin: Skin is warm and dry. No erythema.   Psychiatric: She has a normal mood and affect. Her behavior is normal. Judgment and thought content normal.         ED Course   Procedures  Labs Reviewed   CBC W/ AUTO DIFFERENTIAL   COMPREHENSIVE METABOLIC PANEL   TROPONIN I     EKG Readings: (Independently Interpreted)   Rhythm: Sinus Bradycardia. Heart Rate: 55.   normal axis, normal intervals, no ischemic changes       Imaging Results          X-Ray Chest 1 View (In process)                                       Clinical Impression: musculoskeletal right scapular pain   Disposition:   Disposition: Discharged   53-year-old black female with history of hypertension presents to the ER complaining that she has right scapula pain worse with movement and palpation temperature 98.6° pulse 98 respiratory rate of 20 blood pressure 141/65 100% sat on room air lungs clear to auscultation bilaterally heart regular  rate rhythm no S3-S4 murmurs on palpation of the right scapular musculature she is tender to palpation there which reproduces the pain the patient is talking about there is no evidence of any external trauma to the area skin overlying this area is normal.  CBC normal comprehensive metabolic panel normal EKG no ischemic changes troponin negative chest x-ray no acute process per radiologist.  The patient was given 30 mg of Toradol IV and 10 mg of Norflex p.o. She feels much better I will send her home diagnosis musculoskeletal right scapular pain asked her to follow up with her primary care doctor today return to the ER for chest pain shortness of breath nausea vomiting fevers cough or any other    I, Dr. Regan Bauman, personally performed the services described in this documentation. All medical record entries made by the scribe were at my direction and in my presence. I have reviewed the chart and agree that the record is accurate and complete. Regan Bauman MD.                   Regan Bauman MD  08/06/18 2614

## 2018-08-06 NOTE — ED TRIAGE NOTES
Sudden sharp pain over R scapular area, states hurts to move or touch, pain through to chest, hurts to breathe, radiates down right arm

## 2018-10-22 ENCOUNTER — HOSPITAL ENCOUNTER (EMERGENCY)
Facility: HOSPITAL | Age: 53
Discharge: HOME | End: 2018-10-22
Attending: EMERGENCY MEDICINE
Payer: MEDICAID

## 2018-10-22 VITALS
BODY MASS INDEX: 31.39 KG/M2 | HEIGHT: 67 IN | TEMPERATURE: 98 F | WEIGHT: 200 LBS | RESPIRATION RATE: 16 BRPM | HEART RATE: 83 BPM | OXYGEN SATURATION: 100 %

## 2018-10-22 DIAGNOSIS — M76.62 TENDONITIS, ACHILLES, LEFT: Primary | ICD-10-CM

## 2018-10-22 PROCEDURE — 96372 THER/PROPH/DIAG INJ SC/IM: CPT

## 2018-10-22 PROCEDURE — 99284 EMERGENCY DEPT VISIT MOD MDM: CPT | Mod: 25

## 2018-10-22 PROCEDURE — 63600175 PHARM REV CODE 636 W HCPCS: Performed by: EMERGENCY MEDICINE

## 2018-10-22 RX ORDER — MELOXICAM 7.5 MG/1
7.5 TABLET ORAL DAILY
Qty: 15 TABLET | Refills: 0 | Status: SHIPPED | OUTPATIENT
Start: 2018-10-22 | End: 2024-01-30

## 2018-10-22 RX ORDER — AMLODIPINE BESYLATE 10 MG/1
10 TABLET ORAL DAILY
COMMUNITY

## 2018-10-22 RX ORDER — DEXAMETHASONE SODIUM PHOSPHATE 4 MG/ML
8 INJECTION, SOLUTION INTRA-ARTICULAR; INTRALESIONAL; INTRAMUSCULAR; INTRAVENOUS; SOFT TISSUE
Status: COMPLETED | OUTPATIENT
Start: 2018-10-22 | End: 2018-10-22

## 2018-10-22 RX ORDER — METFORMIN HYDROCHLORIDE 500 MG/1
500 TABLET ORAL 2 TIMES DAILY WITH MEALS
COMMUNITY

## 2018-10-22 RX ADMIN — DEXAMETHASONE SODIUM PHOSPHATE 8 MG: 4 INJECTION, SOLUTION INTRAMUSCULAR; INTRAVENOUS at 11:10

## 2018-10-22 NOTE — ED PROVIDER NOTES
Encounter Date: 10/22/2018    SCRIBE #1 NOTE: I, Katharina Chavis, am scribing for, and in the presence of,  Dr. Abrams . I have scribed the entire note.       History     Chief Complaint   Patient presents with    Ankle Pain     patient complains of L ankle pain and swelling x2 days.     Raiza Padilla is a 53 y.o. female who presents to the ED complaining of left ankle pain for the past two days. She describes throbbing pain right over her achilles tendon, with associated swelling. Pain is exacerbated with weight bearing and  Is tender to palpation on exam. No alleviating factors reported. Patient reports that she has been immobilizing her LLE, with no reported relief. Patient has never experienced similar symptoms in the past.       The history is provided by the patient.     Review of patient's allergies indicates:   Allergen Reactions    Codeine Nausea And Vomiting     Past Medical History:   Diagnosis Date    Hypertension      Past Surgical History:   Procedure Laterality Date     SECTION      TUBAL LIGATION       History reviewed. No pertinent family history.  Social History     Tobacco Use    Smoking status: Never Smoker    Smokeless tobacco: Never Used   Substance Use Topics    Alcohol use: Yes     Comment: socially    Drug use: No     Review of Systems   Constitutional: Negative for chills and fever.   HENT: Negative for congestion, rhinorrhea and sore throat.    Eyes: Negative for redness and visual disturbance.   Respiratory: Negative for cough, shortness of breath and wheezing.    Cardiovascular: Negative for chest pain and palpitations.   Gastrointestinal: Negative for abdominal pain, diarrhea, nausea and vomiting.   Genitourinary: Negative for dysuria and hematuria.   Musculoskeletal: Negative for back pain and neck pain.        Pain over left Achilles tendon   Skin: Negative for rash.   Neurological: Negative for dizziness, weakness and light-headedness.    Psychiatric/Behavioral: Negative for confusion.       Physical Exam     Initial Vitals [10/22/18 1105]   BP Pulse Resp Temp SpO2   -- 83 16 98.3 °F (36.8 °C) 100 %      MAP       --         Physical Exam    Nursing note and vitals reviewed.  Constitutional: She appears well-developed and well-nourished. She is not diaphoretic. No distress.   HENT:   Head: Normocephalic and atraumatic.   Mouth/Throat: Oropharynx is clear and moist.   Eyes: Conjunctivae and EOM are normal. Pupils are equal, round, and reactive to light.   Neck: Normal range of motion. Neck supple.   Cardiovascular: Normal rate, regular rhythm and normal heart sounds. Exam reveals no gallop and no friction rub.    No murmur heard.  Pulmonary/Chest: Breath sounds normal. She has no wheezes. She has no rhonchi. She has no rales.   Abdominal: Soft. Bowel sounds are normal. There is no tenderness. There is no rebound and no guarding.   Musculoskeletal: Normal range of motion. She exhibits edema ( mild swelling) and tenderness.   Mild swelling and tenderness to the left Achilles tendon   No calf tenderness or swelling, Full ROM   Lymphadenopathy:     She has no cervical adenopathy.   Neurological: She is alert and oriented to person, place, and time. She has normal strength.   Skin: Skin is warm and dry. Capillary refill takes less than 2 seconds. No rash noted.         ED Course   Procedures  Labs Reviewed - No data to display       Imaging Results    None          Medical Decision Making:   ED Management:  Patient is here with Achilles tendinitis.  She was given a shot of steroids in the emergency department will be discharged with Mobic.  She will be given some exercises to do.                      Clinical Impression:     1. Tendonitis, Achilles, left             I, Nadine Abrams, personally performed the services described in this documentation. All medical record entries made by the scribe were at my direction and in my presence.  I have reviewed the  chart and agree that the record reflects my personal performance and is accurate and complete. Nadine Abrams M.D. 11:57 AM10/22/2018                 Nadine Abrams MD  10/22/18 1159

## 2019-03-01 ENCOUNTER — HOSPITAL ENCOUNTER (EMERGENCY)
Facility: HOSPITAL | Age: 54
Discharge: HOME | End: 2019-03-01
Attending: EMERGENCY MEDICINE
Payer: COMMERCIAL

## 2019-03-01 VITALS
SYSTOLIC BLOOD PRESSURE: 140 MMHG | OXYGEN SATURATION: 99 % | WEIGHT: 170 LBS | HEART RATE: 84 BPM | RESPIRATION RATE: 20 BRPM | TEMPERATURE: 99 F | BODY MASS INDEX: 26.68 KG/M2 | DIASTOLIC BLOOD PRESSURE: 76 MMHG | HEIGHT: 67 IN

## 2019-03-01 DIAGNOSIS — M77.9 TENDONITIS: Primary | ICD-10-CM

## 2019-03-01 DIAGNOSIS — M79.673 HEEL PAIN: ICD-10-CM

## 2019-03-01 PROCEDURE — 96372 THER/PROPH/DIAG INJ SC/IM: CPT

## 2019-03-01 PROCEDURE — 99284 EMERGENCY DEPT VISIT MOD MDM: CPT | Mod: 25

## 2019-03-01 PROCEDURE — 63600175 PHARM REV CODE 636 W HCPCS: Performed by: NURSE PRACTITIONER

## 2019-03-01 RX ORDER — MELOXICAM 7.5 MG/1
7.5 TABLET ORAL DAILY
Qty: 20 TABLET | Refills: 0 | Status: SHIPPED | OUTPATIENT
Start: 2019-03-01 | End: 2024-01-30 | Stop reason: SDUPTHER

## 2019-03-01 RX ORDER — DEXAMETHASONE SODIUM PHOSPHATE 4 MG/ML
8 INJECTION, SOLUTION INTRA-ARTICULAR; INTRALESIONAL; INTRAMUSCULAR; INTRAVENOUS; SOFT TISSUE
Status: COMPLETED | OUTPATIENT
Start: 2019-03-01 | End: 2019-03-01

## 2019-03-01 RX ADMIN — DEXAMETHASONE SODIUM PHOSPHATE 8 MG: 4 INJECTION, SOLUTION INTRAMUSCULAR; INTRAVENOUS at 06:03

## 2019-03-02 NOTE — ED PROVIDER NOTES
"Encounter Date: 3/1/2019       History     Chief Complaint   Patient presents with    Ankle Pain     intermittent left foot/ankle pain/swelling for months. pain most intense in heel and causes shooting pains up back of leg     Patient is a 53 y.o. female past medical history of hypertension who presents to the ED complaining of intermittent left ankle/foot pain for the past year that has gotten worse over the past few days.  Denies injury or trauma.  Patient states that she was seen here previously for same complaint, given a steroid shot and prescription for an NSAID.  Patient states that she stands up on her feet a lot during the day for her job. Describes the pain as "throbbing" and on her heel.  Associates swelling and tenderness to palpation.  Pain is exacerbated with weight bearing and is tender to palpation on exam. No alleviating factors reported.  Denies leg pain or swelling. Denies history of blood clots.  Denies fever, chills, numbness, tingling, or any other concerns.       The history is provided by the patient.     Review of patient's allergies indicates:   Allergen Reactions    Codeine Nausea And Vomiting     Past Medical History:   Diagnosis Date    Hypertension      Past Surgical History:   Procedure Laterality Date     SECTION      TUBAL LIGATION       History reviewed. No pertinent family history.  Social History     Tobacco Use    Smoking status: Never Smoker    Smokeless tobacco: Never Used   Substance Use Topics    Alcohol use: Yes     Comment: socially    Drug use: No     Review of Systems   Constitutional: Negative for chills and fever.   Musculoskeletal: Positive for arthralgias (left heel pain). Negative for back pain, gait problem, neck pain and neck stiffness.   Neurological: Negative for weakness and numbness.   Hematological: Does not bruise/bleed easily.   All other systems reviewed and are negative.      Physical Exam     Initial Vitals [19 1739]   BP Pulse Resp " Temp SpO2   (!) 140/76 84 20 98.9 °F (37.2 °C) 99 %      MAP       --         Physical Exam    Vitals reviewed.  Constitutional: She appears well-developed and well-nourished.   HENT:   Head: Atraumatic.   Eyes: EOM are normal.   Neck: Normal range of motion, full passive range of motion without pain and phonation normal. Neck supple.   Cardiovascular: Regular rhythm.   No calf tenderness or swelling noted bilaterally.   Pulmonary/Chest: No respiratory distress.   Musculoskeletal:        Left foot: There is tenderness and swelling. There is normal range of motion, no bony tenderness, normal capillary refill, no crepitus, no deformity and no laceration.        Feet:    Neurological: She is alert and oriented to person, place, and time. She has normal strength. No sensory deficit.   Skin: Skin is warm.   Psychiatric: She has a normal mood and affect.         ED Course   Procedures  Labs Reviewed - No data to display       Imaging Results          X-Ray Foot Complete Left (Final result)  Result time 03/01/19 18:39:45    Final result by Marie Fowler MD (03/01/19 18:39:45)                 Impression:      No acute osseous abnormality identified.      Electronically signed by: Marie Fowler MD  Date:    03/01/2019  Time:    18:39             Narrative:    EXAMINATION:  XR FOOT COMPLETE 3 VIEW LEFT    CLINICAL HISTORY:  .  Pain in unspecified foot    TECHNIQUE:  AP, lateral and oblique views of the left foot were performed.    COMPARISON:  None    FINDINGS:  No evidence of acute displaced fracture, dislocation, or osseous destructive process.  Lisfranc articulation is congruent.  Mild degenerative changes are seen in the 1st MTP joint.  Prominent calcaneal spurring is seen.                                 Medical Decision Making:   History:   Old Medical Records: I decided to obtain old medical records.  Old Records Summarized: records from clinic visits.       <> Summary of Records: Patient seen in ED for same in  October 2018, given steroid shot and d/c with prescription for Mobic.  Initial Assessment:   Patient presents to ED complaining of intermittent left ankle/ft pain x1 year that has gotten worse over the past few days.  Appears well, nontoxic.  Afebrile. VSS.  No calf tenderness or swelling noted bilaterally. TTP to left Achilles tendon.  No warmth or overlying erythema.  Negative Forbes test.  Steady gait.  Clinical Tests:   Radiological Study: Reviewed and Ordered  ED Management:  Xray, IM decadron   Other:   I discussed test(s) with the performing physician.  X-ray shows no acute abnormalities.  No signs of septic joint or cellulitis.  Patient's signs symptoms most likely due to tendonitis.  Patient is hemodynamically stable and will be DC home with prescription for Mobic.  Patient instructed to take prescribed medication as labeled as needed.  Use R.I.C.E. and you may need to go get fitted for shoes.  Instructed to follow-up with [facility] in 1-2 days return to ED for any concerns or worsening symptoms.  Patient verbalized understanding, compliance, and agreement with treatment plan.              Attending Attestation:     Physician Attestation Statement for NP/PA:   I discussed this assessment and plan of this patient with the NP/PA, but I did not personally examine the patient. The face to face encounter was performed by the NP/PA.    Other NP/PA Attestation Additions:    History of Present Illness: 53F with intermittent left ankle pain.      Medical Decision Making: Negative xray today. Tendonitis suspected.  Treat with mobic, RICE therapy.                      Clinical Impression:       ICD-10-CM ICD-9-CM   1. Tendonitis M77.9 726.90   2. Heel pain M79.673 729.5                                Dmitri Llanes NP  03/01/19 1922       Maria E Oleary MD  03/01/19 1944

## 2019-03-02 NOTE — DISCHARGE INSTRUCTIONS
Take prescribed medication as labeled as needed.  Use R.I.C.E. and you may need to go get fitted for shoes.  Follow-up with [facility] in 1-2 days return to ED for any concerns or worsening symptoms.

## 2021-03-27 ENCOUNTER — HOSPITAL ENCOUNTER (EMERGENCY)
Facility: HOSPITAL | Age: 56
Discharge: HOME | End: 2021-03-27
Attending: EMERGENCY MEDICINE
Payer: MEDICAID

## 2021-03-27 VITALS
OXYGEN SATURATION: 99 % | TEMPERATURE: 99 F | HEART RATE: 56 BPM | DIASTOLIC BLOOD PRESSURE: 71 MMHG | RESPIRATION RATE: 37 BRPM | HEIGHT: 67 IN | SYSTOLIC BLOOD PRESSURE: 152 MMHG | BODY MASS INDEX: 34.53 KG/M2 | WEIGHT: 220 LBS

## 2021-03-27 DIAGNOSIS — R42 DIZZINESS: ICD-10-CM

## 2021-03-27 LAB
ALBUMIN SERPL BCP-MCNC: 4.2 G/DL (ref 3.5–5.2)
ALP SERPL-CCNC: 86 U/L (ref 55–135)
ALT SERPL W/O P-5'-P-CCNC: 18 U/L (ref 10–44)
ANION GAP SERPL CALC-SCNC: 11 MMOL/L (ref 8–16)
AST SERPL-CCNC: 16 U/L (ref 10–40)
BASOPHILS # BLD AUTO: 0.03 K/UL (ref 0–0.2)
BASOPHILS NFR BLD: 0.4 % (ref 0–1.9)
BILIRUB SERPL-MCNC: 0.4 MG/DL (ref 0.1–1)
BUN SERPL-MCNC: 13 MG/DL (ref 6–20)
CALCIUM SERPL-MCNC: 9.3 MG/DL (ref 8.7–10.5)
CHLORIDE SERPL-SCNC: 108 MMOL/L (ref 95–110)
CO2 SERPL-SCNC: 23 MMOL/L (ref 23–29)
CREAT SERPL-MCNC: 0.7 MG/DL (ref 0.5–1.4)
DIFFERENTIAL METHOD: ABNORMAL
EOSINOPHIL # BLD AUTO: 0.1 K/UL (ref 0–0.5)
EOSINOPHIL NFR BLD: 1.6 % (ref 0–8)
ERYTHROCYTE [DISTWIDTH] IN BLOOD BY AUTOMATED COUNT: 14.5 % (ref 11.5–14.5)
EST. GFR  (AFRICAN AMERICAN): >60 ML/MIN/1.73 M^2
EST. GFR  (NON AFRICAN AMERICAN): >60 ML/MIN/1.73 M^2
GLUCOSE SERPL-MCNC: 108 MG/DL (ref 70–110)
HCT VFR BLD AUTO: 41.6 % (ref 37–48.5)
HGB BLD-MCNC: 14.2 G/DL (ref 12–16)
IMM GRANULOCYTES # BLD AUTO: 0.01 K/UL (ref 0–0.04)
IMM GRANULOCYTES NFR BLD AUTO: 0.1 % (ref 0–0.5)
LYMPHOCYTES # BLD AUTO: 2.4 K/UL (ref 1–4.8)
LYMPHOCYTES NFR BLD: 35 % (ref 18–48)
MCH RBC QN AUTO: 25.1 PG (ref 27–31)
MCHC RBC AUTO-ENTMCNC: 34.1 G/DL (ref 32–36)
MCV RBC AUTO: 74 FL (ref 82–98)
MONOCYTES # BLD AUTO: 0.7 K/UL (ref 0.3–1)
MONOCYTES NFR BLD: 10.2 % (ref 4–15)
NEUTROPHILS # BLD AUTO: 3.7 K/UL (ref 1.8–7.7)
NEUTROPHILS NFR BLD: 52.7 % (ref 38–73)
NRBC BLD-RTO: 0 /100 WBC
PLATELET # BLD AUTO: 245 K/UL (ref 150–350)
PMV BLD AUTO: 11.2 FL (ref 9.2–12.9)
POTASSIUM SERPL-SCNC: 4.1 MMOL/L (ref 3.5–5.1)
PROT SERPL-MCNC: 7.7 G/DL (ref 6–8.4)
RBC # BLD AUTO: 5.66 M/UL (ref 4–5.4)
SODIUM SERPL-SCNC: 142 MMOL/L (ref 136–145)
TROPONIN I SERPL DL<=0.01 NG/ML-MCNC: <0.006 NG/ML (ref 0–0.03)
WBC # BLD AUTO: 6.95 K/UL (ref 3.9–12.7)

## 2021-03-27 PROCEDURE — 85025 COMPLETE CBC W/AUTO DIFF WBC: CPT | Performed by: EMERGENCY MEDICINE

## 2021-03-27 PROCEDURE — 93010 EKG 12-LEAD: ICD-10-PCS | Mod: ,,, | Performed by: INTERNAL MEDICINE

## 2021-03-27 PROCEDURE — 93010 ELECTROCARDIOGRAM REPORT: CPT | Mod: ,,, | Performed by: INTERNAL MEDICINE

## 2021-03-27 PROCEDURE — 84484 ASSAY OF TROPONIN QUANT: CPT | Performed by: EMERGENCY MEDICINE

## 2021-03-27 PROCEDURE — 93005 ELECTROCARDIOGRAM TRACING: CPT

## 2021-03-27 PROCEDURE — 99285 EMERGENCY DEPT VISIT HI MDM: CPT | Mod: 25

## 2021-03-27 PROCEDURE — 25000003 PHARM REV CODE 250: Performed by: EMERGENCY MEDICINE

## 2021-03-27 PROCEDURE — 80053 COMPREHEN METABOLIC PANEL: CPT | Performed by: EMERGENCY MEDICINE

## 2021-03-27 RX ORDER — MECLIZINE HYDROCHLORIDE 25 MG/1
25 TABLET ORAL 3 TIMES DAILY PRN
Qty: 20 TABLET | Refills: 0 | Status: SHIPPED | OUTPATIENT
Start: 2021-03-27

## 2021-03-27 RX ORDER — LOSARTAN POTASSIUM 25 MG/1
25 TABLET ORAL DAILY
COMMUNITY

## 2021-03-27 RX ORDER — MECLIZINE HYDROCHLORIDE 25 MG/1
25 TABLET ORAL
Status: COMPLETED | OUTPATIENT
Start: 2021-03-27 | End: 2021-03-27

## 2021-03-27 RX ADMIN — MECLIZINE HYDROCHLORIDE 25 MG: 25 TABLET ORAL at 02:03

## 2024-01-25 DIAGNOSIS — M25.551 BILATERAL HIP PAIN: Primary | ICD-10-CM

## 2024-01-25 DIAGNOSIS — M25.552 BILATERAL HIP PAIN: Primary | ICD-10-CM

## 2024-01-30 ENCOUNTER — OFFICE VISIT (OUTPATIENT)
Dept: ORTHOPEDICS | Facility: CLINIC | Age: 59
End: 2024-01-30
Payer: MEDICAID

## 2024-01-30 DIAGNOSIS — M16.0 BILATERAL PRIMARY OSTEOARTHRITIS OF HIP: ICD-10-CM

## 2024-01-30 DIAGNOSIS — M70.61 TROCHANTERIC BURSITIS OF BOTH HIPS: Primary | ICD-10-CM

## 2024-01-30 DIAGNOSIS — M70.62 TROCHANTERIC BURSITIS OF BOTH HIPS: Primary | ICD-10-CM

## 2024-01-30 PROCEDURE — 99213 OFFICE O/P EST LOW 20 MIN: CPT | Mod: PBBFAC,PN | Performed by: ORTHOPAEDIC SURGERY

## 2024-01-30 PROCEDURE — 99202 OFFICE O/P NEW SF 15 MIN: CPT | Mod: S$PBB,,, | Performed by: ORTHOPAEDIC SURGERY

## 2024-01-30 PROCEDURE — 1160F RVW MEDS BY RX/DR IN RCRD: CPT | Mod: CPTII,,, | Performed by: ORTHOPAEDIC SURGERY

## 2024-01-30 PROCEDURE — 1159F MED LIST DOCD IN RCRD: CPT | Mod: CPTII,,, | Performed by: ORTHOPAEDIC SURGERY

## 2024-01-30 PROCEDURE — 99999 PR PBB SHADOW E&M-EST. PATIENT-LVL III: CPT | Mod: PBBFAC,,, | Performed by: ORTHOPAEDIC SURGERY

## 2024-01-30 RX ORDER — INDOMETHACIN 50 MG/1
50 CAPSULE ORAL 2 TIMES DAILY WITH MEALS
Qty: 60 CAPSULE | Refills: 2 | Status: SHIPPED | OUTPATIENT
Start: 2024-01-30

## 2024-01-30 NOTE — PROGRESS NOTES
Subjective:      Patient ID: Raiza Padilla is a 58 y.o. female.    Chief Complaint: Pain of the Left Hip, Pain of the Right Hip, and Consult    HPI      Raiza Padilla is seen for evaluation and treatment of hip pain.  They have experienced problems with their bilateral hip over the past several months Pain is located laterally. They have been treated with NSAIDS.   Symptoms have recently stayed the same. Ambulation reportedly has not been impaired. Self care ADLs are not painful.     The patient reports that her father has a history of hip replacement    Review of Systems   Constitutional: Negative for fever and weight loss.   HENT:  Negative for congestion.    Eyes:  Negative for visual disturbance.   Cardiovascular:  Negative for chest pain.   Respiratory:  Negative for shortness of breath.    Hematologic/Lymphatic: Negative for bleeding problem. Does not bruise/bleed easily.   Skin:  Negative for poor wound healing.   Musculoskeletal:  Positive for joint pain.   Gastrointestinal:  Negative for abdominal pain.   Genitourinary:  Negative for dysuria.   Neurological:  Negative for seizures.   Psychiatric/Behavioral:  Negative for altered mental status.    Allergic/Immunologic: Negative for persistent infections.         Objective:            Ortho/SPM Exam      Right hip    The patient is not in acute distress.   Body habitus is:  Overweight   Sclerae normal  The patient walks without a limp.   Respiratory distress:  none  The skin over the hip is:intact.   There is mild lateral tenderness  Range of motion- Flexion full, External rotation full, internal rotation full.  Resisted SLR negative.  Pain with rotation negative  Sciatic tension findings negative.  Shortening/lengthening compared to the contralateral side absent.  Pulses DP present, PT present.  Motor normal 5/5 strength in all tested muscle groups.   Sensory normal.    The left hip is identical    IMAGING- radiographs of both hips show  no fracture and no localized bone lesion.  Joint space is maintained.  There is calcification near the abductor insertion left greater than right              Assessment:       Encounter Diagnoses   Name Primary?    Bilateral primary osteoarthritis of hip     Trochanteric bursitis of both hips Yes             given the family history, I can not completely rule out early osteoarthritis.  That notwithstanding, current symptoms are more consistent with soft tissue mediated condition.    I explained the moderate concept of abductor tendonitis/bursitis        Plan:       Raiza was seen today for pain, pain and consult.    Diagnoses and all orders for this visit:    Trochanteric bursitis of both hips    Bilateral primary osteoarthritis of hip  -     Ambulatory referral/consult to Orthopedics          Discontinue meloxicam and start Indocin    I explained to the patient that I am providing a prescription for anti-inflammatory medication.  I warned the patient that it should not be taken with other anti-inflammatory medications including over-the-counter products containing ibuprofen and naproxen.  I advised them to consult their primary physician or pharmacist if there is any question about this in the future.  I discussed the possible side effects including cardiovascular issues, renal issues and gastrointestinal problems.  I advised the patient to discontinue the medication should they develop persistent symptoms of dyspepsia.    I also explained that should they elect to continue this medication long-term, that is beyond the prescription that I provide at this time, they should contact their primary physician for refills and appropriate monitoring.    Physical therapy

## 2024-04-30 ENCOUNTER — OFFICE VISIT (OUTPATIENT)
Dept: ORTHOPEDICS | Facility: CLINIC | Age: 59
End: 2024-04-30
Payer: MEDICAID

## 2024-04-30 DIAGNOSIS — M70.61 TROCHANTERIC BURSITIS OF BOTH HIPS: Primary | ICD-10-CM

## 2024-04-30 DIAGNOSIS — M70.62 TROCHANTERIC BURSITIS OF BOTH HIPS: Primary | ICD-10-CM

## 2024-04-30 PROCEDURE — 20610 DRAIN/INJ JOINT/BURSA W/O US: CPT | Mod: PBBFAC,PN | Performed by: ORTHOPAEDIC SURGERY

## 2024-04-30 PROCEDURE — 1159F MED LIST DOCD IN RCRD: CPT | Mod: CPTII,,, | Performed by: ORTHOPAEDIC SURGERY

## 2024-04-30 PROCEDURE — 99213 OFFICE O/P EST LOW 20 MIN: CPT | Mod: S$PBB,25,, | Performed by: ORTHOPAEDIC SURGERY

## 2024-04-30 PROCEDURE — 99999PBSHW PR PBB SHADOW TECHNICAL ONLY FILED TO HB: Mod: PBBFAC,,,

## 2024-04-30 PROCEDURE — 1160F RVW MEDS BY RX/DR IN RCRD: CPT | Mod: CPTII,,, | Performed by: ORTHOPAEDIC SURGERY

## 2024-04-30 PROCEDURE — 99212 OFFICE O/P EST SF 10 MIN: CPT | Mod: PBBFAC,PN,25 | Performed by: ORTHOPAEDIC SURGERY

## 2024-04-30 PROCEDURE — 99999 PR PBB SHADOW E&M-EST. PATIENT-LVL II: CPT | Mod: PBBFAC,,, | Performed by: ORTHOPAEDIC SURGERY

## 2024-04-30 RX ORDER — TRIAMCINOLONE ACETONIDE 40 MG/ML
40 INJECTION, SUSPENSION INTRA-ARTICULAR; INTRAMUSCULAR
Status: DISCONTINUED | OUTPATIENT
Start: 2024-04-30 | End: 2024-04-30 | Stop reason: HOSPADM

## 2024-04-30 RX ADMIN — TRIAMCINOLONE ACETONIDE 40 MG: 40 INJECTION, SUSPENSION INTRA-ARTICULAR; INTRAMUSCULAR at 10:04

## 2024-04-30 NOTE — PROGRESS NOTES
Subjective:      Patient ID: Raiza Padilla is a 59 y.o. female.    Chief Complaint: Pain of the Left Hip, Pain of the Left Shoulder, and Pain of the Right Hip    HPI    Follow-up for bursitis.  The patient reports that her hip pain is principally on the left.  She could not tolerate the Indocin because of GI side effects.  She reports some relief with ibuprofen and Tylenol.  The patient reports that her symptoms wax and wane and are not disabling.  In general, she states that she feels better than at her last visit          Review of Systems   Constitutional: Negative for fever and weight loss.   HENT:  Negative for congestion.    Eyes:  Negative for visual disturbance.   Cardiovascular:  Negative for chest pain.   Respiratory:  Negative for shortness of breath.    Hematologic/Lymphatic: Negative for bleeding problem. Does not bruise/bleed easily.   Skin:  Negative for poor wound healing.   Musculoskeletal:  Positive for joint pain.   Gastrointestinal:  Negative for abdominal pain.   Genitourinary:  Negative for dysuria.   Neurological:  Negative for seizures.   Psychiatric/Behavioral:  Negative for altered mental status.    Allergic/Immunologic: Negative for persistent infections.         Objective:      Ortho/SPM Exam      Left hip    The patient is not in acute distress.   Body habitus is:normal.   Sclerae normal  The patient walks without a limp.   Respiratory distress:  none  The skin over the hip is:intact.   There is mild lateral tenderness  Range of motion- Flexion full, External rotation full, internal rotation full.  Resisted SLR negative.  Pain with rotation negative  Sciatic tension findings negative.  Shortening/lengthening compared to the contralateral side absent.  Pulses DP present, PT present.  Motor normal 5/5 strength in all tested muscle groups.   Sensory normal.    Right hip  Nontender  Full range of motion            Assessment:       Encounter Diagnosis   Name Primary?     Trochanteric bursitis of both hips Yes        This is a chronic and established condition that will require long-term suppression.  As long as symptoms are mild, symptomatic treatment is appropriate.  Periodic injections are likely to be beneficial for flare-ups.          Plan:       Raiza was seen today for pain, pain and pain.    Diagnoses and all orders for this visit:    Trochanteric bursitis of both hips          I explained my diagnostic impression and the reasoning behind it in detail, using layman's terms.  Models and/or pictures were used to help in the explanation.    Left hip injection recommended and consent given

## 2024-04-30 NOTE — PROCEDURES
Large Joint Aspiration/Injection: L hip joint    Date/Time: 4/30/2024 10:00 AM    Performed by: Everton Daly MD  Authorized by: Everton Daly MD    Location:  Hip  Site:  L hip joint  Medications:  40 mg triamcinolone acetonide 40 mg/mL     After obtaining verbal informed consent the patient's left greater trochanteric bursa was aseptically injected with 40 mg of triamcinolone and 1 cc of 1% plain Xylocaine.  Appropriate management of postinjection flare was explained.

## 2024-08-14 ENCOUNTER — OFFICE VISIT (OUTPATIENT)
Dept: ORTHOPEDICS | Facility: CLINIC | Age: 59
End: 2024-08-14
Payer: MEDICAID

## 2024-08-14 DIAGNOSIS — M70.62 TROCHANTERIC BURSITIS OF LEFT HIP: Primary | ICD-10-CM

## 2024-08-14 DIAGNOSIS — M25.552 LEFT HIP PAIN: ICD-10-CM

## 2024-08-14 PROCEDURE — 99999PBSHW PR PBB SHADOW TECHNICAL ONLY FILED TO HB: Mod: PBBFAC,,,

## 2024-08-14 PROCEDURE — 99213 OFFICE O/P EST LOW 20 MIN: CPT | Mod: PBBFAC,PN

## 2024-08-14 PROCEDURE — 20610 DRAIN/INJ JOINT/BURSA W/O US: CPT | Mod: PBBFAC,PN

## 2024-08-14 PROCEDURE — 99999 PR PBB SHADOW E&M-EST. PATIENT-LVL III: CPT | Mod: PBBFAC,,,

## 2024-08-14 RX ORDER — TRIAMCINOLONE ACETONIDE 40 MG/ML
40 INJECTION, SUSPENSION INTRA-ARTICULAR; INTRAMUSCULAR
Status: DISCONTINUED | OUTPATIENT
Start: 2024-08-14 | End: 2024-08-14 | Stop reason: HOSPADM

## 2024-08-14 RX ADMIN — TRIAMCINOLONE ACETONIDE 40 MG: 40 INJECTION, SUSPENSION INTRA-ARTICULAR; INTRAMUSCULAR at 10:08

## 2024-08-14 NOTE — PROGRESS NOTES
Subjective:      Patient ID: Raiza Padilla is a 59 y.o. female.    Chief Complaint: Pain of the Left Hip and Pain of the Right Hip      HPI: Raiza Padilla is a 59 y.o. female who presents to clinic for follow up of left hip pain. Patient was last seen by Dr. Daly on 04/30/2024 for this and corticosteroid injection was performed. She reports symptoms returned around 6 weeks ago. She denies a history of trauma.  The pain is aggravated by prolonged walking and exercise.  Previous treatments include Indocin, however this was discontinued due to GI upset.  Patient reports some relief with OTC Tylenol and Ibuprofen.  Denies numbness, tingling, radiation, and inability to bear weight. The pain is affecting ADLs and limiting desired level of activity. She has received good relief with left hip (trochanteric bursa) injection in the past (last injection on 04/30/2024) and is requesting repeat cortisone injection today in the left hip.    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  History of DVT/PE: Negative    PAST MEDICAL HISTORY:    Past Medical History:   Diagnosis Date    Hypertension      MEDICATIONS:   Current Outpatient Medications:     amLODIPine (NORVASC) 10 MG tablet, Take 10 mg by mouth once daily., Disp: , Rfl:     carvedilol (COREG) 6.25 MG tablet, Take 6.25 mg by mouth 2 (two) times daily with meals., Disp: , Rfl:     indomethacin (INDOCIN) 50 MG capsule, Take 1 capsule (50 mg total) by mouth 2 (two) times daily with meals. (Patient not taking: Reported on 4/30/2024), Disp: 60 capsule, Rfl: 2    losartan (COZAAR) 25 MG tablet, Take 25 mg by mouth once daily., Disp: , Rfl:     meclizine (ANTIVERT) 25 mg tablet, Take 1 tablet (25 mg total) by mouth 3 (three) times daily as needed for Dizziness. (Patient not taking: Reported on 1/30/2024), Disp: 20 tablet, Rfl: 0    metFORMIN (GLUCOPHAGE) 500 MG tablet, Take 500 mg by  mouth 2 (two) times daily with meals., Disp: , Rfl:     ALLERGIES:   Review of patient's allergies indicates:   Allergen Reactions    Codeine Nausea And Vomiting    Indomethacin Nausea And Vomiting       Review of Systems:  Constitution: Negative for chills, fever and night sweats.   HENT: Negative for congestion and headaches.    Eyes: Negative for blurred vision or vision loss.  Cardiovascular: Negative for chest pain and syncope.   Respiratory: Negative for cough and shortness of breath.    Endocrine: Negative for polydipsia, polyphagia and polyuria.   Hematologic/Lymphatic: Negative for bleeding problem. Does not bruise/bleed easily.   Skin: Negative for dry skin, itching and rash.   Musculoskeletal: See HPI.   Gastrointestinal: Negative for abdominal pain and bowel incontinence.   Genitourinary: Negative for bladder incontinence and nocturia.   Neurological: Negative for disturbances in coordination, loss of balance and seizures.   Psychiatric/Behavioral: Negative for depression. The patient does not have insomnia.    Allergic/Immunologic: Negative for hives and persistent infections.          Objective:      There were no vitals filed for this visit.    PHYSICAL EXAM:  General: Alert & oriented x3, well-developed and well-nourished, in no acute distress, sitting comfortably in the exam room.  Skin: Warm and dry. Capillary refill less than 2 seconds.   Head: Normocephalic and atraumatic.   Eyes: Sclera appear normal.   Nose: No deformities seen.   Ears: No deformities seen.   Neck: No tracheal deviation present.   Pulmonary/Chest: Breathing unlabored.   Neurological: Alert and oriented to person, place, and time.   Psychiatric: Mood is pleasant and affect appropriate.     LEFT HIP:        Body habitus is:   overweight .         The patient walks without a limp.         Resisted SLR:  negative.        Sciatic tension findings:  negative.        The skin over the hip is intact.        Tenderness:  lateral.         Range of Motion:    Flexion:  Full  External Rotation:  Full  Internal Rotation:  Full        Pain with rotation:  negative        Shortening/lengthening compared to the contralateral side:  absent.        Pulses DP present, PT present.        Motor:  normal 5/5 strength in all tested muscle groups.         Sensory:  normal.    Imaging:   X-Rays: 3 views of bilateral hip dated 01/30/2024 , and independently reviewed, show: No acute fractures or dislocations. Joint spaces are preserved. There is calcification near the abductor insertion left greater than right.        Assessment:       1. Trochanteric bursitis of left hip    2. Left hip pain        Plan:       Orders Placed This Encounter    Large Joint Aspiration/Injection: L greater trochanteric bursa     I explained the nature of the problem to the patient.     I discussed at length with the patient all the different treatment options available for her left hip including anti-inflammatories, acetaminophen, rest, ice, heat, physical/occupational therapy, and corticosteroid injections. I explained the potential role of surgery in the treatment of this condition. The patient understands that if non-surgical measures do not adequately control symptoms, surgery will be considered in the future.     Medications:  Continue OTC Tylenol and/or NSAIDs as needed for pain management.    Physical Therapy:  Ambulatory referral to physical therapy for hip ROM, stretching, strengthening, and conditioning.  Procedures:  Corticosteroid injection requested and performed today to the left hip (trochanteric bursa). See procedure note. Standard precautions provided.  Pain Management:  Apply ice and/or heat compress to the affected area 2-3x a day for 15-20 minutes as needed for pain management.      Follow-Up: 3 months with Cyndi Tabor PA-C for follow-up.     All of the patient's questions were answered and the patient will contact us if they have any questions or concerns in  the interim.    Cyndi Tabor PA-C  [facility]  Orthopedic Surgery    Medical Dictation software was used during the dictation of portions or the entirety of this medical record.  Phonetic or grammatic errors may exist due to the use of this software. For clarification, refer to the author of the document.

## 2024-08-14 NOTE — PROCEDURES
Large Joint Aspiration/Injection: L greater trochanteric bursa    Date/Time: 8/14/2024 10:00 AM    Performed by: Cyndi Tabor PA-C  Authorized by: Cyndi Tabor PA-C    Consent Done?:  Yes (Verbal)  Indications:  Pain  Site marked: the procedure site was marked    Timeout: prior to procedure the correct patient, procedure, and site was verified      Local anesthesia used?: Yes    Local anesthetic:  Topical anesthetic    Details:  Needle Size:  22 G  Ultrasonic Guidance for needle placement?: No    Approach:  Lateral  Location:  Hip  Site:  L greater trochanteric bursa  Medications:  40 mg triamcinolone acetonide 40 mg/mL  Patient tolerance:  Patient tolerated the procedure well with no immediate complications    Injection Procedure Note   Prior to procedure the correct patient, procedure, and site was verified. Allergies were reviewed and verbal consent was obtained. The procedure site was marked.    After time out was performed, the patient was prepped aseptically with chloraprep, the area was sprayed with local topical anesthetic, and then cleaned with alcohol. A therapeutic injection of combination of 2 cc 1% Xylocaine and 40mg Triamcinolone was given under sterile technique using a 22-gauge x 1.5 needle from the lateral aspect of the left hip. Sterile dressing applied.     Patient tolerated the procedure well. Pain decreased. She had no adverse reactions to the medication.     The patient is cautioned that immediate relief of pain is secondary to the local anesthetic and will be temporary. After the anesthetic wears off there may be a increase in pain that may last for a few hours or a few days. Advised patient to avoid strenuous activities for the next 24-48 hours and to apply ice for 20 minutes to help alleviate this this pain. She was warned of possible blood sugar and/or blood pressure changes following injection. She was reminded to call the clinic immediately for any adverse side effects  as explained in clinic today.

## 2024-11-14 ENCOUNTER — OFFICE VISIT (OUTPATIENT)
Dept: ORTHOPEDICS | Facility: CLINIC | Age: 59
End: 2024-11-14
Payer: MEDICAID

## 2024-11-14 DIAGNOSIS — M25.552 LEFT HIP PAIN: ICD-10-CM

## 2024-11-14 DIAGNOSIS — M70.62 TROCHANTERIC BURSITIS OF LEFT HIP: Primary | ICD-10-CM

## 2024-11-14 PROCEDURE — 99214 OFFICE O/P EST MOD 30 MIN: CPT | Mod: S$PBB,,,

## 2024-11-14 PROCEDURE — 99212 OFFICE O/P EST SF 10 MIN: CPT | Mod: PBBFAC,PN

## 2024-11-14 PROCEDURE — 1159F MED LIST DOCD IN RCRD: CPT | Mod: CPTII,,,

## 2024-11-14 PROCEDURE — 1160F RVW MEDS BY RX/DR IN RCRD: CPT | Mod: CPTII,,,

## 2024-11-14 PROCEDURE — 99999 PR PBB SHADOW E&M-EST. PATIENT-LVL II: CPT | Mod: PBBFAC,,,

## 2024-11-14 NOTE — PROGRESS NOTES
Subjective:      Patient ID: Raiza Padilla is a 59 y.o. female.    Chief Complaint: Pain of the Left Hip      HPI: Raiza Padilla is a 59 y.o. female who presents to clinic for follow up of left hip pain. Patient was last seen by myself on 08/14/2024 for this and left hip corticosteroid injection was performed. She reports her previous corticosteroid injection is still providing her with good relief. She denies a history of trauma. The pain is aggravated by prolonged walking and exercise. Denies numbness, tingling, radiation, and inability to bear weight. The pain is affecting ADLs and limiting desired level of activity.  Previous treatments include Indocin, however this was discontinued due to GI upset.  Patient reports some relief with OTC Tylenol and Ibuprofen.  Last visit physical therapy was ordered however patient never attended, due to family issues. Patient has been trying to exercise recently. She has received good relief with previous left hip (trochanteric bursa) injection and is requesting to hold off on repeat cortisone injection in the left hip until symptoms worsen.      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  History of DVT/PE: Negative    PAST MEDICAL HISTORY:    Past Medical History:   Diagnosis Date    Hypertension      MEDICATIONS:   Current Outpatient Medications:     amLODIPine (NORVASC) 10 MG tablet, Take 10 mg by mouth once daily., Disp: , Rfl:     carvedilol (COREG) 6.25 MG tablet, Take 6.25 mg by mouth 2 (two) times daily with meals., Disp: , Rfl:     losartan (COZAAR) 25 MG tablet, Take 25 mg by mouth once daily., Disp: , Rfl:     meclizine (ANTIVERT) 25 mg tablet, Take 1 tablet (25 mg total) by mouth 3 (three) times daily as needed for Dizziness., Disp: 20 tablet, Rfl: 0    metFORMIN (GLUCOPHAGE) 500 MG tablet, Take 500 mg by mouth 2 (two) times daily with meals., Disp: , Rfl:     indomethacin  (INDOCIN) 50 MG capsule, Take 1 capsule (50 mg total) by mouth 2 (two) times daily with meals. (Patient not taking: Reported on 11/14/2024), Disp: 60 capsule, Rfl: 2    ALLERGIES:   Review of patient's allergies indicates:   Allergen Reactions    Codeine Nausea And Vomiting    Indomethacin Nausea And Vomiting       Review of Systems:  Constitution: Negative for chills, fever and night sweats.   HENT: Negative for congestion and headaches.    Eyes: Negative for blurred vision or vision loss.  Cardiovascular: Negative for chest pain and syncope.   Respiratory: Negative for cough and shortness of breath.    Endocrine: Negative for polydipsia, polyphagia and polyuria.   Hematologic/Lymphatic: Negative for bleeding problem. Does not bruise/bleed easily.   Skin: Negative for dry skin, itching and rash.   Musculoskeletal: See HPI.   Gastrointestinal: Negative for abdominal pain and bowel incontinence.   Genitourinary: Negative for bladder incontinence and nocturia.   Neurological: Negative for disturbances in coordination, loss of balance and seizures.   Psychiatric/Behavioral: Negative for depression. The patient does not have insomnia.    Allergic/Immunologic: Negative for hives and persistent infections.          Objective:      There were no vitals filed for this visit.    PHYSICAL EXAM:  General: Alert & oriented x3, well-developed and well-nourished, in no acute distress, sitting comfortably in the exam room.  Skin: Warm and dry. Capillary refill less than 2 seconds.   Head: Normocephalic and atraumatic.   Eyes: Sclera appear normal.   Nose: No deformities seen.   Ears: No deformities seen.   Neck: No tracheal deviation present.   Pulmonary/Chest: Breathing unlabored.   Neurological: Alert and oriented to person, place, and time.   Psychiatric: Mood is pleasant and affect appropriate.     LEFT HIP:        Body habitus is:  overweight.         The patient walks without a limp.         Resisted SLR:  negative.         Sciatic tension findings:  negative.        The skin over the hip is intact.        Tenderness:  lateral.        Range of Motion:    Flexion:  Full  External Rotation:  Full  Internal Rotation:  Full        Pain with rotation:  negative        Shortening/lengthening compared to the contralateral side:  absent.        Pulses DP present, PT present.        Motor:  normal 5/5 strength in all tested muscle groups.         Sensory:  normal.    Imaging:   X-Rays: 3 views of bilateral hip dated 01/30/2024, and independently reviewed, show: No acute fractures or dislocations. Joint spaces are preserved. There is calcification near the abductor insertion left greater than right.         Assessment:       1. Trochanteric bursitis of left hip    2. Left hip pain        Plan:            I explained the nature of the problem to the patient.     I discussed at length with the patient all the different treatment options available for her left hip including anti-inflammatories, acetaminophen, rest, ice, heat, physical/occupational therapy, and corticosteroid injections. I explained the potential role of surgery in the treatment of this condition. The patient understands that if non-surgical measures do not adequately control symptoms, surgery will be considered in the future.     Medications:  Continue OTC Tylenol and/or NSAIDs as needed for pain management.    Physical Therapy:  Encouraged patient to follow through with previous referral to physical therapy for hip ROM, stretching, strengthening, and conditioning.  Procedures:  None today.  Consider CSI if symptoms return/worsen.  Pain Management:  Apply ice and/or heat compress to the affected area 2-3x a day for 15-20 minutes as needed for pain management.      Follow-Up:  Patient will contact the office if she would like repeat corticosteroid injection.    All of the patient's questions were answered and the patient will contact us if they have any questions or concerns in the  interim.    Cyndi Tabor PA-C  [facility]  Orthopedic Surgery    Medical Dictation software was used during the dictation of portions or the entirety of this medical record.  Phonetic or grammatic errors may exist due to the use of this software. For clarification, refer to the author of the document.

## 2025-04-17 ENCOUNTER — OFFICE VISIT (OUTPATIENT)
Dept: ORTHOPEDICS | Facility: CLINIC | Age: 60
End: 2025-04-17
Payer: MEDICAID

## 2025-04-17 VITALS
SYSTOLIC BLOOD PRESSURE: 140 MMHG | DIASTOLIC BLOOD PRESSURE: 75 MMHG | RESPIRATION RATE: 18 BRPM | BODY MASS INDEX: 38.7 KG/M2 | HEART RATE: 78 BPM | HEIGHT: 67 IN | WEIGHT: 246.56 LBS

## 2025-04-17 DIAGNOSIS — M25.552 LEFT HIP PAIN: ICD-10-CM

## 2025-04-17 DIAGNOSIS — M70.62 TROCHANTERIC BURSITIS OF LEFT HIP: Primary | ICD-10-CM

## 2025-04-17 PROCEDURE — 99213 OFFICE O/P EST LOW 20 MIN: CPT | Mod: PBBFAC,PN

## 2025-04-17 PROCEDURE — 99999PBSHW PR PBB SHADOW TECHNICAL ONLY FILED TO HB: Mod: PBBFAC,,,

## 2025-04-17 PROCEDURE — 99999 PR PBB SHADOW E&M-EST. PATIENT-LVL III: CPT | Mod: PBBFAC,,,

## 2025-04-17 PROCEDURE — 20610 DRAIN/INJ JOINT/BURSA W/O US: CPT | Mod: PBBFAC,PN,LT

## 2025-04-17 RX ORDER — LORATADINE 10 MG/1
TABLET ORAL
COMMUNITY

## 2025-04-17 RX ORDER — FLUTICASONE PROPIONATE 50 MCG
SPRAY, SUSPENSION (ML) NASAL
COMMUNITY
Start: 2025-04-15

## 2025-04-17 RX ORDER — CETIRIZINE HYDROCHLORIDE 10 MG/1
10 TABLET ORAL
COMMUNITY

## 2025-04-17 RX ORDER — KETOCONAZOLE 20 MG/G
CREAM TOPICAL
COMMUNITY

## 2025-04-17 RX ORDER — TRIAMCINOLONE ACETONIDE 40 MG/ML
40 INJECTION, SUSPENSION INTRA-ARTICULAR; INTRAMUSCULAR
Status: DISCONTINUED | OUTPATIENT
Start: 2025-04-17 | End: 2025-04-17 | Stop reason: HOSPADM

## 2025-04-17 RX ORDER — TRIAMCINOLONE ACETONIDE 5 MG/G
OINTMENT TOPICAL
COMMUNITY

## 2025-04-17 RX ORDER — OLOPATADINE HYDROCHLORIDE 1 MG/ML
1 SOLUTION OPHTHALMIC 2 TIMES DAILY
COMMUNITY

## 2025-04-17 RX ADMIN — TRIAMCINOLONE ACETONIDE 40 MG: 40 INJECTION, SUSPENSION INTRA-ARTICULAR; INTRAMUSCULAR at 08:04

## 2025-04-17 NOTE — PROGRESS NOTES
Subjective:      Patient ID: Raiza Padilla is a 60 y.o. female.    Chief Complaint: Pain of the Left Hip and Injections      HPI: Raiza Padilla is a 60 y.o. female who presents to clinic for follow up of left hip pain. Patient was last seen by myself on 11/14/2024 for this. Last corticosteroid injection was performed on 08/14/2025 and patient reports symptoms returned approximately 1 month ago. She denies a history of trauma.  Pain today 5/10.  The pain is aggravated by prolonged walking and exercise. Denies numbness, tingling, radiation, and inability to bear weight. The pain is affecting ADLs and limiting desired level of activity.  Previous treatments include Indocin, however this was discontinued due to GI upset.  Patient reports some relief with OTC Tylenol and Ibuprofen. Physical therapy was previously ordered however patient never attended, due to family issues. Patient has been trying to exercise recently. She has received good relief with previous left hip (trochanteric bursa) injection and is requesting repeat cortisone injection in the left hip.     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  History of DVT/PE: Negative    PAST MEDICAL HISTORY:    Past Medical History:   Diagnosis Date    Hypertension      MEDICATIONS:   Current Outpatient Medications:     amLODIPine (NORVASC) 10 MG tablet, Take 10 mg by mouth once daily., Disp: , Rfl:     carvedilol (COREG) 6.25 MG tablet, Take 6.25 mg by mouth 2 (two) times daily with meals., Disp: , Rfl:     cetirizine (ZYRTEC) 10 MG tablet, Take 10 mg by mouth., Disp: , Rfl:     fluticasone propionate (FLONASE) 50 mcg/actuation nasal spray, by Each Nostril route., Disp: , Rfl:     ketoconazole (NIZORAL) 2 % cream, , Disp: , Rfl:     loratadine (CLARITIN) 10 mg tablet, 1 tablet every day by oral route., Disp: , Rfl:     losartan (COZAAR) 25 MG tablet, Take 25 mg by mouth once  daily., Disp: , Rfl:     meclizine (ANTIVERT) 25 mg tablet, Take 1 tablet (25 mg total) by mouth 3 (three) times daily as needed for Dizziness., Disp: 20 tablet, Rfl: 0    metFORMIN (GLUCOPHAGE) 500 MG tablet, Take 500 mg by mouth 2 (two) times daily with meals., Disp: , Rfl:     olopatadine (PATANOL) 0.1 % ophthalmic solution, Place 1 drop into both eyes 2 (two) times daily., Disp: , Rfl:     triamcinolone (KENALOG) 0.5 % ointment, SMARTSIG:sparingly Topical Twice Daily, Disp: , Rfl:     Current Facility-Administered Medications:     triamcinolone acetonide injection 40 mg, 40 mg, Intra-articular, , Cyndi Tabor PA-C, 40 mg at 04/17/25 0830    ALLERGIES:   Review of patient's allergies indicates:   Allergen Reactions    Codeine Nausea And Vomiting    Indomethacin Nausea And Vomiting       Review of Systems:  Constitution: Negative for chills, fever and night sweats.   HENT: Negative for congestion and headaches.    Eyes: Negative for blurred vision or vision loss.  Cardiovascular: Negative for chest pain and syncope.   Respiratory: Negative for cough and shortness of breath.    Endocrine: Negative for polydipsia, polyphagia and polyuria.   Hematologic/Lymphatic: Negative for bleeding problem. Does not bruise/bleed easily.   Skin: Negative for dry skin, itching and rash.   Musculoskeletal: See HPI.   Gastrointestinal: Negative for abdominal pain and bowel incontinence.   Genitourinary: Negative for bladder incontinence and nocturia.   Neurological: Negative for disturbances in coordination, loss of balance and seizures.   Psychiatric/Behavioral: Negative for depression. The patient does not have insomnia.    Allergic/Immunologic: Negative for hives and persistent infections.          Objective:      Vitals:    04/17/25 0826   BP: (!) 140/75   Pulse: 78   Resp: 18       PHYSICAL EXAM:  General: Alert & oriented x3, well-developed and well-nourished, in no acute distress, sitting comfortably in the exam  room.  Skin: Warm and dry. Capillary refill less than 2 seconds.   Head: Normocephalic and atraumatic.   Eyes: Sclera appear normal.   Nose: No deformities seen.   Ears: No deformities seen.   Neck: No tracheal deviation present.   Pulmonary/Chest: Breathing unlabored.   Neurological: Alert and oriented to person, place, and time.   Psychiatric: Mood is pleasant and affect appropriate.     LEFT HIP:        Body habitus is:  overweight.         The patient walks without a limp.         Resisted SLR:  negative.        Sciatic tension findings:  negative.        The skin over the hip is intact.        Tenderness:  lateral.        Range of Motion:    Flexion:  Full  External Rotation:  Full  Internal Rotation:  Full        Pain with rotation:  negative        Shortening/lengthening compared to the contralateral side:  absent.        Pulses DP present, PT present.        Motor:  normal 5/5 strength in all tested muscle groups.         Sensory:  normal.    Imaging:   X-Rays: 3 views of bilateral hip dated 01/30/2024, and independently reviewed, show: No acute fractures or dislocations. Joint spaces are preserved. There is calcification near the abductor insertion left greater than right.         Assessment:       1. Trochanteric bursitis of left hip    2. Left hip pain        Plan:       Orders Placed This Encounter    Large Joint Aspiration/Injection: L greater trochanteric bursa    Ambulatory Referral/Consult to Physical Therapy    triamcinolone acetonide injection 40 mg     I explained the nature of the problem to the patient.     I discussed at length with the patient all the different treatment options available for her left hip including anti-inflammatories, acetaminophen, rest, ice, heat, physical/occupational therapy, and corticosteroid injections. I explained the potential role of surgery in the treatment of this condition. The patient understands that if non-surgical measures do not adequately control symptoms,  surgery will be considered in the future.     Medications:  Continue OTC Tylenol and/or NSAIDs as needed for pain management.    Physical Therapy:  New ambulatory referral to physical therapy for hip ROM, stretching, strengthening, and conditioning.    Procedures:  Corticosteroid injection requested and performed today to the left  hip (trochanteric bursa) . See procedure note. Standard precautions provided.  Pain Management:  Apply ice and/or heat compress to the affected area 2-3x a day for 15-20 minutes as needed for pain management.      Follow-Up:  Patient will contact the office if she would like repeat corticosteroid injection.    All of the patient's questions were answered and the patient will contact us if they have any questions or concerns in the interim.    Cyndi Tabor PA-C  [facility]  Orthopedic Surgery    Medical Dictation software was used during the dictation of portions or the entirety of this medical record.  Phonetic or grammatic errors may exist due to the use of this software. For clarification, refer to the author of the document.

## 2025-04-17 NOTE — PROCEDURES
Large Joint Aspiration/Injection: L greater trochanteric bursa    Date/Time: 4/17/2025 8:30 AM    Performed by: Cyndi Tabor PA-C  Authorized by: Cyndi Tabor PA-C    Consent Done?:  Yes (Verbal)  Indications:  Pain  Site marked: the procedure site was marked    Timeout: prior to procedure the correct patient, procedure, and site was verified      Local anesthesia used?: Yes    Local anesthetic:  Topical anesthetic    Details:  Needle Size:  22 G  Ultrasonic Guidance for needle placement?: No    Approach:  Lateral  Location:  Hip  Site:  L greater trochanteric bursa  Medications:  40 mg triamcinolone acetonide 40 mg/mL  Patient tolerance:  Patient tolerated the procedure well with no immediate complications    Injection Procedure Note   Prior to procedure the correct patient, procedure, and site was verified. Allergies were reviewed and verbal consent was obtained. The procedure site was marked.    After time out was performed, the patient was prepped aseptically with chloraprep, the area was sprayed with local topical anesthetic, and then cleaned with alcohol. A therapeutic injection of combination of 2 cc 1% Xylocaine and 40mg Triamcinolone was given under sterile technique using a 22-gauge x 1.5 needle from the lateral aspect of the left hip. Sterile dressing applied.     Patient tolerated the procedure well. Pain decreased. She had no adverse reactions to the medication.     The patient is cautioned that immediate relief of pain is secondary to the local anesthetic and will be temporary. After the anesthetic wears off there may be a increase in pain that may last for a few hours or a few days. Advised patient to avoid strenuous activities for the next 24-48 hours and to apply ice for 20 minutes to help alleviate this this pain. She was warned of possible blood sugar and/or blood pressure changes following injection. She was reminded to call the clinic immediately for any adverse side effects as  explained in clinic today.

## 2025-04-26 ENCOUNTER — HOSPITAL ENCOUNTER (EMERGENCY)
Facility: HOSPITAL | Age: 60
Discharge: HOME | End: 2025-04-26
Attending: EMERGENCY MEDICINE
Payer: MEDICAID

## 2025-04-26 VITALS
DIASTOLIC BLOOD PRESSURE: 78 MMHG | TEMPERATURE: 98 F | WEIGHT: 245 LBS | OXYGEN SATURATION: 99 % | RESPIRATION RATE: 18 BRPM | HEART RATE: 82 BPM | SYSTOLIC BLOOD PRESSURE: 152 MMHG | BODY MASS INDEX: 38.45 KG/M2 | HEIGHT: 67 IN

## 2025-04-26 DIAGNOSIS — T78.40XA ALLERGIC REACTION, INITIAL ENCOUNTER: Primary | ICD-10-CM

## 2025-04-26 PROCEDURE — 63600175 PHARM REV CODE 636 W HCPCS: Performed by: EMERGENCY MEDICINE

## 2025-04-26 PROCEDURE — 99284 EMERGENCY DEPT VISIT MOD MDM: CPT

## 2025-04-26 PROCEDURE — 25000003 PHARM REV CODE 250: Performed by: EMERGENCY MEDICINE

## 2025-04-26 RX ORDER — PREDNISONE 20 MG/1
20 TABLET ORAL 2 TIMES DAILY
Qty: 10 TABLET | Refills: 0 | Status: SHIPPED | OUTPATIENT
Start: 2025-04-26 | End: 2025-05-01

## 2025-04-26 RX ORDER — PREDNISONE 20 MG/1
60 TABLET ORAL
Status: COMPLETED | OUTPATIENT
Start: 2025-04-26 | End: 2025-04-26

## 2025-04-26 RX ORDER — PREDNISONE 20 MG/1
20 TABLET ORAL 2 TIMES DAILY
Qty: 10 TABLET | Refills: 0 | Status: SHIPPED | OUTPATIENT
Start: 2025-04-26 | End: 2025-04-26

## 2025-04-26 RX ORDER — FAMOTIDINE 20 MG/1
40 TABLET, FILM COATED ORAL
Status: COMPLETED | OUTPATIENT
Start: 2025-04-26 | End: 2025-04-26

## 2025-04-26 RX ADMIN — PREDNISONE 60 MG: 20 TABLET ORAL at 09:04

## 2025-04-26 RX ADMIN — FAMOTIDINE 40 MG: 20 TABLET, FILM COATED ORAL at 09:04

## 2025-04-27 NOTE — ED PROVIDER NOTES
Encounter Date: 2025       History     Chief Complaint   Patient presents with    Rash     Pt presents to the ED r/t rash appearing on body on Tuesday. Pt reports just returning back from [place].      Chief complaint: Rash    HPI: 60-year-old female presents with a 5 day history of a pruritic rash of the chest and proximal anterior thighs.  She reports that she was in [place] and was and swim where but has no known exposure.  She denies any difficulty breathing or swallowing.  Past medical history is significant for hypertension.  She denies any known history of urticaria.      Review of patient's allergies indicates:   Allergen Reactions    Codeine Nausea And Vomiting    Indomethacin Nausea And Vomiting     Past Medical History:   Diagnosis Date    Hypertension      Past Surgical History:   Procedure Laterality Date     SECTION      TUBAL LIGATION       No family history on file.  Social History[1]  Review of Systems   Constitutional:  Negative for fever.   Respiratory:  Negative for shortness of breath.    Genitourinary:  Negative for flank pain.   Musculoskeletal:  Negative for gait problem.   Skin:  Positive for rash.   Neurological:  Negative for weakness.   Psychiatric/Behavioral:  Negative for confusion.        Physical Exam     Initial Vitals [25]   BP Pulse Resp Temp SpO2   (!) 158/74 84 16 98.2 °F (36.8 °C) 99 %      MAP       --         Physical Exam    Nursing note and vitals reviewed.  Constitutional: Vital signs are normal. She appears well-developed and well-nourished. She is active.  Non-toxic appearance. She does not have a sickly appearance. No distress.   HENT:   Head: Normocephalic and atraumatic.   Eyes: Conjunctivae and lids are normal.   Neck: Phonation normal.   Cardiovascular:  Normal rate.           Pulmonary/Chest: No respiratory distress.   Abdominal: She exhibits no distension.     Neurological: She is alert and oriented to person, place, and time.   Skin: Skin  is warm and intact.   Erythematous macules of the chest, upper abdomen and upper thigh   Psychiatric: She has a normal mood and affect. Her speech is normal.         ED Course   Procedures  Labs Reviewed - No data to display       Imaging Results    None          Medications   predniSONE tablet 60 mg (has no administration in time range)   famotidine tablet 40 mg (has no administration in time range)     Medical Decision Making  60-year-old female presents with a pruritic rash of the chest and legs that began 5 days ago.  It has an appearance consistent with an allergic reaction.  There is an unknown allergen.  She has no airway involvement.  She is treated with a H2 blocker and oral steroids.    Risk  Prescription drug management.                                      Clinical Impression:  Final diagnoses:  [T78.40XA] Allergic reaction, initial encounter (Primary)          ED Disposition Condition    Discharge Good          ED Prescriptions       Medication Sig Dispense Start Date End Date Auth. Provider    predniSONE (DELTASONE) 20 MG tablet  (Status: Discontinued) Take 1 tablet (20 mg total) by mouth 2 (two) times daily. for 5 days 10 tablet 4/26/2025 4/26/2025 Arnol Nagel III, MD    predniSONE (DELTASONE) 20 MG tablet Take 1 tablet (20 mg total) by mouth 2 (two) times daily. for 5 days 10 tablet 4/26/2025 5/1/2025 Arnol Nagel III, MD          Follow-up Information       Follow up With Specialties Details Why Contact Info    Lisbet Newman, NP Family Medicine In 3 days  [address] [facility]  [place] [address] [phone]                 [1]   Social History  Tobacco Use    Smoking status: Never    Smokeless tobacco: Never   Substance Use Topics    Alcohol use: Yes     Comment: socially    Drug use: No        Arnol Nagel III, MD  04/26/25 8500

## 2025-05-05 ENCOUNTER — CLINICAL SUPPORT (OUTPATIENT)
Dept: REHABILITATION | Facility: HOSPITAL | Age: 60
End: 2025-05-05
Payer: MEDICAID

## 2025-05-05 DIAGNOSIS — M25.552 LEFT HIP PAIN: ICD-10-CM

## 2025-05-05 DIAGNOSIS — M70.62 TROCHANTERIC BURSITIS OF LEFT HIP: ICD-10-CM

## 2025-05-05 DIAGNOSIS — Z74.09 DECREASED FUNCTIONAL MOBILITY AND ENDURANCE: Primary | ICD-10-CM

## 2025-05-05 PROCEDURE — 97162 PT EVAL MOD COMPLEX 30 MIN: CPT

## 2025-05-05 NOTE — PROGRESS NOTES
Outpatient Rehab    Physical Therapy Evaluation    Patient Name: Raiza Padilla  MRN: [id number]  YOB: 1965  Encounter Date: 5/5/2025    Therapy Diagnosis:   Encounter Diagnoses   Name Primary?    Trochanteric bursitis of left hip     Left hip pain     Decreased functional mobility and endurance Yes     Physician: Cyndi Tabor P*    Physician Orders: Eval and Treat  Medical Diagnosis: Trochanteric bursitis of left hip  Left hip pain    Visit # / Visits Authorized:  1 / 1  Insurance Authorization Period: 4/17/2025 to 4/17/2026  Date of Evaluation: 5/5/2025  Plan of Care Certification: 5/5/2025 to 7/15/2025     Time In: 1005   Time Out: 1100  Total Time (in minutes): 55   Total Billable Time (in minutes): 55    Intake Outcome Measure for FOTO Survey    Therapist reviewed FOTO scores for Raiza Padilla on 5/5/2025.   FOTO report - see Media section or FOTO account episode details.     Intake Score:  % see media         Subjective   History of Present Illness  Raiza is a 60 y.o. female who reports to physical therapy with a chief concern of Left hip pain.     The patient reports a medical diagnosis of M70.62 (ICD-10-CM) - Trochanteric bursitis, left hip  M25.552 (ICD-10-CM) - Pain in left hip.            Dominant Hand: Right  History of Present Condition/Illness: Patient presents with chief complaint of L hip pain. Patient reports her L hip has been bothering her off and on for about 1 year. Reports she has been getting steroid injections; recent one on 4/17/2025. Reports she went to [place] shortly after which involved extensive walking. Patient reports she watches her grandchildren regularly. Reports she does not have a fitness routine but would like to start walking for exercise. Patient denies radiating pain, noting her pain stays local to her left hip. Reports her pain increases when standing still; for example when washing dishes folding laundry.    Activities of  Daily Living  Social history was obtained from Patient.               Previously independent with activities of daily living? Yes     Currently independent with activities of daily living? Yes          Previously independent with instrumental activities of daily living? Yes     Currently independent with instrumental activities of daily living? Yes              Pain     Patient reports a current pain level of 5/10. Pain at best is reported as 0/10. Pain at worst is reported as 10/10.   Location: left hip  Clinical Progression (since onset): Stable  Pain Qualities: Aching, Sharp  Pain-Relieving Factors: Activity modification, Heat, Ice  Pain-Aggravating Factors: Stair climbing, Walking         Review of Systems  Patient denies: Cancer History, Cardiac History, and Diabetes  Additional Red Flag Details: High BP; takes medication     Treatment History  Treatments  Previously Received Treatments: Yes  Currently Receiving Treatments: No  Additional Treatment Details: Steroid injection    Living Arrangements  Living Situation  Housing: Home independently  Living Arrangements: Spouse/significant other    Home Setup  Type of Structure: Apartment/condo  Home Access: Level entry  Number of Levels in Home: Two level        Employment  Patient does not report that: Does the patient's condition impact their ability to work?  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    Raiza Padilla  has a past surgical history that includes Tubal ligation and  section.    Raiza has a current medication list which includes the following prescription(s): amlodipine, carvedilol, cetirizine, fluticasone propionate, ketoconazole, loratadine, losartan, meclizine, metformin, olopatadine, and triamcinolone.    Review of patient's allergies indicates:   Allergen Reactions    Codeine  Nausea And Vomiting    Indomethacin Nausea And Vomiting        Objective          Observation: Pt ambulates with mild R hip drop      Posture: forward head and rounded shoulders     Lumbar Range of Motion:     Limitation Pain   Flexion 50%     denies pain   Extension 50% Denies pain      Left Side Bending 50%  denies pain   Right Side Bending 50%  denies pain      Balance Assessment:       Evaluation   Single Limb Stance R LE  < 10 seconds w/ HHA  (<10 sec = HIGH FALL RISK)   Single Limb Stance L LE < 10 seconds w/ HHA  (<10 sec = HIGH FALL RISK)   Feet together EO or tandem NT   Feet together EC or tandem NT        Lower Extremity Strength  Right LE   Left LE     Hip Ext 3+/5 Hip Ext 3/5   Hip ABD 3+/5 Hip ABD  3/5   Hip IR 3+/5 Hip IR 3/5   Hip ER 3/5 Hip ER 3/5   Hip ADD (seated) 5/5 Hip ADD (seated) 5/5   Hip FLEX 3+/5 Hip FLEX 3+/5   Knee FLEX 5/5 Knee FLEX 5/5   Knee EXT 5/5 Knee EXT 5/5   Plantar flexion (seated) 4/5 Plantar flexion (seated) 4/5   Dorsiflexion 5/5 Dorsiflexion 5/5      Neural Tension Testing:  SLR: L (-); R (-)  Slump Test: NP    Special Test:   Repeated Flexion: -  Repeated Ext: -  Prone Instability Test: NP  Bridge Test: NP    Sensation: intact lower extremity dermatomes    Range of Motion:  R Hip Active(Passive) L Hip Active (Passive)   Flexion 80 (90) 80 (90)   Extension WFL (WFL) WFL (WFL)   IR < 35 < 35   ER < 45 < 45       Endurance Assessment:       Evaluation   Timed Up and Go 8 seconds   30 sec STS  10x          Table: Population Norms for TUG    Age  Average TUG    60 - 69 years  8.1 seconds    70 - 79 years  9.2 seconds    80 - 99 years  11.3 seconds       Special Tests:  Miguel A Test:   L knee flexion:   R knee flexion   FADIR: R (-)               L: (+)  PAULINA: R (-)               L: (-)  Scours: R: (-)               L (-)   Treatment:  Therapeutic Exercise  TE 1: Patient education: HEP performance, prognosis, plan of care      Time Entry(in minutes):  PT Evaluation (Moderate)  Time Entry: 55    Assessment & Plan   Assessment  Raiza presents with a condition of Low complexity.   Presentation of Symptoms: Stable  Will Comorbidities Impact Care: No       Functional Limitations: Activity tolerance, Completing self-care activities, Proprioception, Range of motion, Participating in leisure activities, Pain with ADLs/IADLs, Gross motor coordination  Impairments: Pain with functional activity, Impaired physical strength, Abnormal gait, Impaired balance, Activity intolerance  Personal Factors Affecting Prognosis: Pain    Prognosis: Good  Assessment Details: Patient presents with s/s consistent with L hip pathology with pain distribution present local to left hip and when loading L hip. Pt demonstrates reduced lumbar ROM secondary to poor lumbar functional mobility. Patient also demonstrates decreased L hip ROM and decreased lower extremity strength. Patient would benefit from skilled outpatient physical therapy to address motor control, strength and range of motion deficits so that she may return to full independence with all household and personal ADL's, and improve overall functional mobility, reduce risk for falls and meet all social and recreational needs.    Plan  From a physical therapy perspective, the patient would benefit from: Skilled Rehab Services    Planned therapy interventions include: Therapeutic exercise, Therapeutic activities, Neuromuscular re-education, Manual therapy, ADLs/IADLs, Other (Comment), and Gait training. Dry Needling (prn)  Planned modalities to include: Biofeedback, Electrical stimulation - attended, Electrical stimulation - passive/unattended, Thermotherapy (hot pack), and Cryotherapy (cold pack).        Visit Frequency: 2 times Per Week for 10 Weeks.       This plan was discussed with Patient.   Discussion participants: Agreed Upon Plan of Care  Plan details: Frequency and duration of treatment to be adjusted as needed          Patient's spiritual, cultural,  and educational needs considered and patient agreeable to plan of care and goals.           Goals:   Active       long term goals       Pt will improve impaired lower extremity manual muscle tests to >/= 4/5 to improve dynamic lower extremity support for closed chain tasks. (Progressing)       Start:  05/06/25    Expected End:  07/29/25            Patient will improve the total FOTO Lumbar Survey Score to </= 30% limited to demonstrate increased perceived functional mobility. (Progressing)       Start:  05/06/25    Expected End:  08/01/25            Patient will perform at least 10-12 sit to stands in 30 seconds without UE support to demonstrate increased functional strength.  (Progressing)       Start:  05/06/25    Expected End:  08/01/25            Patient will perform 3 consecutive hip hinges with less than 0VC to demonstrate improved lumbopelvic movement for safety during household ADL's and improved lifting techniques.  (Progressing)       Start:  05/06/25    Expected End:  08/01/25            Patient will initiate walking program to demonstrate improved activity tolerance. (Progressing)       Start:  05/06/25    Expected End:  08/01/25               short term goals       Patient will be independent with home exercise program to supplement physical therapy treatment in improving functional status. (Progressing)       Start:  05/06/25            Pt will improve impaired lower extremity manual muscle tests to >/= 3+/5 to improve dynamic lower extremity support for closed chain tasks. (Progressing)       Start:  05/06/25    Expected End:  06/10/25            Patient will perform 3 consecutive hip hinges with less than 3 VC to demonstrate improved lumbopelvic movement for safety during household ADL's and improved lifting techniques.  (Progressing)       Start:  05/06/25    Expected End:  06/13/25            Patient will report decreased pain with activity 3/10 to demonstrate improved tolerance for activity and  household ADL's. (Progressing)       Start:  05/06/25    Expected End:  06/13/25                Verito Salomon PT, LAINET, OCS

## 2025-05-06 PROBLEM — Z74.09 DECREASED FUNCTIONAL MOBILITY AND ENDURANCE: Status: ACTIVE | Noted: 2025-05-06

## 2025-05-12 ENCOUNTER — CLINICAL SUPPORT (OUTPATIENT)
Dept: REHABILITATION | Facility: HOSPITAL | Age: 60
End: 2025-05-12
Payer: MEDICAID

## 2025-05-12 DIAGNOSIS — Z74.09 DECREASED FUNCTIONAL MOBILITY AND ENDURANCE: Primary | ICD-10-CM

## 2025-05-12 PROCEDURE — 97110 THERAPEUTIC EXERCISES: CPT | Mod: CQ

## 2025-05-12 NOTE — PROGRESS NOTES
Outpatient Rehab    Physical Therapy Visit    Patient Name: Raiza Padilla  MRN: [id number]  YOB: 1965  Encounter Date: 5/12/2025    Therapy Diagnosis:   Encounter Diagnosis   Name Primary?    Decreased functional mobility and endurance Yes     Physician: Cyndi Tabor P*    Physician Orders: Eval and Treat  Medical Diagnosis: Trochanteric bursitis, left hip  Pain in left hip    Visit # / Visits Authorized:  1 / 10  Insurance Authorization Period: 5/1/2025 to 12/31/2025  Date of Evaluation: 5/5/2025  Plan of Care Certification: 5/5/2025 to 7/15/2025      PT/PTA: PTA   Number of PTA visits since last PT visit:1  Time In: 0800   Time Out: 0832  Total Time (in minutes): 32   Total Billable Time (in minutes): 32    FOTO:  Intake Score: 33%  Survey Score 2:  %  Survey Score 3:  %    Precautions: None      Subjective   Patient reports her grandson is having brain surgery today so she has been unable to do her home exercises. Her hip feels ok this morning, she denies any pain. Request to leave early due to her grandson being in the hospital for possible surgery.  Pain reported as 0/10. Left hip    Objective  Objective Measures updated at progress report unless specified.     Treatment:  Therapeutic Exercise  TE 1: Patient education: HEP performance, prognosis, plan of care  TE 2: Glut series: glut sets 10 second hold, 4 minutes -- HL bridges 3 x 10 reps  TE 3: HL Hip ABD isos with gait belt: 5 second hold, 4 minutes  TE 4: Posterior pelvic tilt: 5 second hold, 4 minutes  TE 5: Aerobic Activity for LE and CV endurance: 8 minutes, Level 1      Time Entry(in minutes):  Therapeutic Exercise Time Entry: 32    Assessment & Plan   Assessment: Shortened treatment session per patient request due to her grandson possibly having brain surgery. Reviewed HEP today and provided new print out with good tolerance noted throughout session. She requires extra cueing with pelvic tilts for proper muscle  activation and technique. Plan to add additional interventions next session.  Evaluation/Treatment Tolerance: Patient tolerated treatment well    Patient will continue to benefit from skilled outpatient physical therapy to address the deficits listed in the problem list box on initial evaluation, provide pt/family education and to maximize pt's level of independence in the home and community environment.     Patient's spiritual, cultural, and educational needs considered and patient agreeable to plan of care and goals.           Plan: Will continue per POC towards treatment goals. PT/PTA met face to face to discuss patient's treatment plan and progress towards established goals. Patient will be seen by physical therapist every sixth visit and minimally once per month.    Goals:   Active       long term goals       Pt will improve impaired lower extremity manual muscle tests to >/= 4/5 to improve dynamic lower extremity support for closed chain tasks. (Progressing)       Start:  05/06/25    Expected End:  07/29/25            Patient will improve the total FOTO Lumbar Survey Score to </= 30% limited to demonstrate increased perceived functional mobility. (Progressing)       Start:  05/06/25    Expected End:  08/01/25            Patient will perform at least 10-12 sit to stands in 30 seconds without UE support to demonstrate increased functional strength.  (Progressing)       Start:  05/06/25    Expected End:  08/01/25            Patient will perform 3 consecutive hip hinges with less than 0VC to demonstrate improved lumbopelvic movement for safety during household ADL's and improved lifting techniques.  (Progressing)       Start:  05/06/25    Expected End:  08/01/25            Patient will initiate walking program to demonstrate improved activity tolerance. (Progressing)       Start:  05/06/25    Expected End:  08/01/25               short term goals       Patient will be independent with home exercise program to  supplement physical therapy treatment in improving functional status. (Progressing)       Start:  05/06/25            Pt will improve impaired lower extremity manual muscle tests to >/= 3+/5 to improve dynamic lower extremity support for closed chain tasks. (Progressing)       Start:  05/06/25    Expected End:  06/10/25            Patient will perform 3 consecutive hip hinges with less than 3 VC to demonstrate improved lumbopelvic movement for safety during household ADL's and improved lifting techniques.  (Progressing)       Start:  05/06/25    Expected End:  06/13/25            Patient will report decreased pain with activity 3/10 to demonstrate improved tolerance for activity and household ADL's. (Progressing)       Start:  05/06/25    Expected End:  06/13/25                Tammi Ribeiro, PTA